# Patient Record
Sex: MALE | Race: BLACK OR AFRICAN AMERICAN | Employment: FULL TIME | URBAN - METROPOLITAN AREA
[De-identification: names, ages, dates, MRNs, and addresses within clinical notes are randomized per-mention and may not be internally consistent; named-entity substitution may affect disease eponyms.]

---

## 2022-09-27 ENCOUNTER — APPOINTMENT (OUTPATIENT)
Dept: GENERAL RADIOLOGY | Age: 47
DRG: 193 | End: 2022-09-27
Payer: COMMERCIAL

## 2022-09-27 ENCOUNTER — HOSPITAL ENCOUNTER (INPATIENT)
Age: 47
LOS: 4 days | Discharge: HOME | DRG: 193 | End: 2022-10-01
Attending: STUDENT IN AN ORGANIZED HEALTH CARE EDUCATION/TRAINING PROGRAM | Admitting: INTERNAL MEDICINE
Payer: COMMERCIAL

## 2022-09-27 ENCOUNTER — APPOINTMENT (OUTPATIENT)
Dept: CT IMAGING | Age: 47
DRG: 193 | End: 2022-09-27
Payer: COMMERCIAL

## 2022-09-27 DIAGNOSIS — J96.01 ACUTE RESPIRATORY FAILURE WITH HYPOXIA (HCC): ICD-10-CM

## 2022-09-27 DIAGNOSIS — I16.1 HYPERTENSIVE EMERGENCY: Primary | ICD-10-CM

## 2022-09-27 DIAGNOSIS — J18.9 PNEUMONIA OF RIGHT LUNG DUE TO INFECTIOUS ORGANISM, UNSPECIFIED PART OF LUNG: ICD-10-CM

## 2022-09-27 PROBLEM — I16.0 HYPERTENSIVE URGENCY: Status: ACTIVE | Noted: 2022-09-27

## 2022-09-27 LAB
A/G RATIO: 1.2 (ref 1.1–2.2)
ALBUMIN SERPL-MCNC: 3.8 G/DL (ref 3.4–5)
ALP BLD-CCNC: 90 U/L (ref 40–129)
ALT SERPL-CCNC: 29 U/L (ref 10–40)
ANION GAP SERPL CALCULATED.3IONS-SCNC: 11 MMOL/L (ref 3–16)
AST SERPL-CCNC: 16 U/L (ref 15–37)
BASOPHILS ABSOLUTE: 0.1 K/UL (ref 0–0.2)
BASOPHILS RELATIVE PERCENT: 1.2 %
BILIRUB SERPL-MCNC: 1.1 MG/DL (ref 0–1)
BUN BLDV-MCNC: 11 MG/DL (ref 7–20)
CALCIUM SERPL-MCNC: 9 MG/DL (ref 8.3–10.6)
CHLORIDE BLD-SCNC: 103 MMOL/L (ref 99–110)
CO2: 24 MMOL/L (ref 21–32)
CREAT SERPL-MCNC: 0.8 MG/DL (ref 0.9–1.3)
D DIMER: 0.4 UG/ML FEU (ref 0–0.6)
EKG ATRIAL RATE: 108 BPM
EKG DIAGNOSIS: NORMAL
EKG P AXIS: 18 DEGREES
EKG P-R INTERVAL: 172 MS
EKG Q-T INTERVAL: 362 MS
EKG QRS DURATION: 88 MS
EKG QTC CALCULATION (BAZETT): 485 MS
EKG R AXIS: 68 DEGREES
EKG T AXIS: 13 DEGREES
EKG VENTRICULAR RATE: 108 BPM
EOSINOPHILS ABSOLUTE: 0 K/UL (ref 0–0.6)
EOSINOPHILS RELATIVE PERCENT: 0.7 %
GFR AFRICAN AMERICAN: >60
GFR NON-AFRICAN AMERICAN: >60
GLUCOSE BLD-MCNC: 328 MG/DL (ref 70–99)
GLUCOSE BLD-MCNC: 404 MG/DL (ref 70–99)
HCT VFR BLD CALC: 42.9 % (ref 40.5–52.5)
HEMOGLOBIN: 14 G/DL (ref 13.5–17.5)
LYMPHOCYTES ABSOLUTE: 1.2 K/UL (ref 1–5.1)
LYMPHOCYTES RELATIVE PERCENT: 18.3 %
MCH RBC QN AUTO: 28.2 PG (ref 26–34)
MCHC RBC AUTO-ENTMCNC: 32.6 G/DL (ref 31–36)
MCV RBC AUTO: 86.6 FL (ref 80–100)
MONOCYTES ABSOLUTE: 0.4 K/UL (ref 0–1.3)
MONOCYTES RELATIVE PERCENT: 6.1 %
NEUTROPHILS ABSOLUTE: 4.9 K/UL (ref 1.7–7.7)
NEUTROPHILS RELATIVE PERCENT: 73.7 %
PDW BLD-RTO: 14.3 % (ref 12.4–15.4)
PERFORMED ON: ABNORMAL
PLATELET # BLD: 233 K/UL (ref 135–450)
PMV BLD AUTO: 8.9 FL (ref 5–10.5)
POTASSIUM SERPL-SCNC: 4.1 MMOL/L (ref 3.5–5.1)
PRO-BNP: 1868 PG/ML (ref 0–124)
PROCALCITONIN: 0.08 NG/ML (ref 0–0.15)
RBC # BLD: 4.96 M/UL (ref 4.2–5.9)
SODIUM BLD-SCNC: 138 MMOL/L (ref 136–145)
TOTAL PROTEIN: 6.9 G/DL (ref 6.4–8.2)
TROPONIN: <0.01 NG/ML
WBC # BLD: 6.6 K/UL (ref 4–11)

## 2022-09-27 PROCEDURE — 94640 AIRWAY INHALATION TREATMENT: CPT

## 2022-09-27 PROCEDURE — 2580000003 HC RX 258: Performed by: STUDENT IN AN ORGANIZED HEALTH CARE EDUCATION/TRAINING PROGRAM

## 2022-09-27 PROCEDURE — 6370000000 HC RX 637 (ALT 250 FOR IP): Performed by: PHYSICIAN ASSISTANT

## 2022-09-27 PROCEDURE — 84484 ASSAY OF TROPONIN QUANT: CPT

## 2022-09-27 PROCEDURE — 83036 HEMOGLOBIN GLYCOSYLATED A1C: CPT

## 2022-09-27 PROCEDURE — 84145 PROCALCITONIN (PCT): CPT

## 2022-09-27 PROCEDURE — 71260 CT THORAX DX C+: CPT | Performed by: PHYSICIAN ASSISTANT

## 2022-09-27 PROCEDURE — 93005 ELECTROCARDIOGRAM TRACING: CPT | Performed by: STUDENT IN AN ORGANIZED HEALTH CARE EDUCATION/TRAINING PROGRAM

## 2022-09-27 PROCEDURE — 99285 EMERGENCY DEPT VISIT HI MDM: CPT

## 2022-09-27 PROCEDURE — 96365 THER/PROPH/DIAG IV INF INIT: CPT

## 2022-09-27 PROCEDURE — U0003 INFECTIOUS AGENT DETECTION BY NUCLEIC ACID (DNA OR RNA); SEVERE ACUTE RESPIRATORY SYNDROME CORONAVIRUS 2 (SARS-COV-2) (CORONAVIRUS DISEASE [COVID-19]), AMPLIFIED PROBE TECHNIQUE, MAKING USE OF HIGH THROUGHPUT TECHNOLOGIES AS DESCRIBED BY CMS-2020-01-R: HCPCS

## 2022-09-27 PROCEDURE — 85025 COMPLETE CBC W/AUTO DIFF WBC: CPT

## 2022-09-27 PROCEDURE — 96375 TX/PRO/DX INJ NEW DRUG ADDON: CPT

## 2022-09-27 PROCEDURE — 2580000003 HC RX 258: Performed by: INTERNAL MEDICINE

## 2022-09-27 PROCEDURE — 6360000002 HC RX W HCPCS: Performed by: STUDENT IN AN ORGANIZED HEALTH CARE EDUCATION/TRAINING PROGRAM

## 2022-09-27 PROCEDURE — 93010 ELECTROCARDIOGRAM REPORT: CPT | Performed by: INTERNAL MEDICINE

## 2022-09-27 PROCEDURE — 6360000002 HC RX W HCPCS: Performed by: PHYSICIAN ASSISTANT

## 2022-09-27 PROCEDURE — 80053 COMPREHEN METABOLIC PANEL: CPT

## 2022-09-27 PROCEDURE — 6360000002 HC RX W HCPCS: Performed by: NURSE PRACTITIONER

## 2022-09-27 PROCEDURE — 2060000000 HC ICU INTERMEDIATE R&B

## 2022-09-27 PROCEDURE — 2500000003 HC RX 250 WO HCPCS: Performed by: STUDENT IN AN ORGANIZED HEALTH CARE EDUCATION/TRAINING PROGRAM

## 2022-09-27 PROCEDURE — 6370000000 HC RX 637 (ALT 250 FOR IP): Performed by: INTERNAL MEDICINE

## 2022-09-27 PROCEDURE — 83880 ASSAY OF NATRIURETIC PEPTIDE: CPT

## 2022-09-27 PROCEDURE — 85379 FIBRIN DEGRADATION QUANT: CPT

## 2022-09-27 PROCEDURE — U0005 INFEC AGEN DETEC AMPLI PROBE: HCPCS

## 2022-09-27 PROCEDURE — 71046 X-RAY EXAM CHEST 2 VIEWS: CPT

## 2022-09-27 PROCEDURE — 6360000004 HC RX CONTRAST MEDICATION: Performed by: PHYSICIAN ASSISTANT

## 2022-09-27 PROCEDURE — 94761 N-INVAS EAR/PLS OXIMETRY MLT: CPT

## 2022-09-27 PROCEDURE — 6370000000 HC RX 637 (ALT 250 FOR IP)

## 2022-09-27 PROCEDURE — 6370000000 HC RX 637 (ALT 250 FOR IP): Performed by: STUDENT IN AN ORGANIZED HEALTH CARE EDUCATION/TRAINING PROGRAM

## 2022-09-27 RX ORDER — ONDANSETRON 4 MG/1
4 TABLET, ORALLY DISINTEGRATING ORAL EVERY 8 HOURS PRN
Status: DISCONTINUED | OUTPATIENT
Start: 2022-09-27 | End: 2022-10-01 | Stop reason: HOSPADM

## 2022-09-27 RX ORDER — METOPROLOL SUCCINATE 100 MG/1
200 TABLET, EXTENDED RELEASE ORAL DAILY
Status: ON HOLD | COMMUNITY
End: 2022-10-01 | Stop reason: HOSPADM

## 2022-09-27 RX ORDER — METOPROLOL SUCCINATE 50 MG/1
200 TABLET, EXTENDED RELEASE ORAL DAILY
Status: DISCONTINUED | OUTPATIENT
Start: 2022-09-27 | End: 2022-10-01 | Stop reason: HOSPADM

## 2022-09-27 RX ORDER — EMPAGLIFLOZIN, METFORMIN HYDROCHLORIDE 12.5; 1 MG/1; MG/1
2 TABLET, EXTENDED RELEASE ORAL DAILY
COMMUNITY
Start: 2022-01-14 | End: 2022-10-05 | Stop reason: SDUPTHER

## 2022-09-27 RX ORDER — ENOXAPARIN SODIUM 100 MG/ML
30 INJECTION SUBCUTANEOUS 2 TIMES DAILY
Status: CANCELLED | OUTPATIENT
Start: 2022-09-27

## 2022-09-27 RX ORDER — SODIUM CHLORIDE 9 MG/ML
INJECTION, SOLUTION INTRAVENOUS PRN
Status: DISCONTINUED | OUTPATIENT
Start: 2022-09-27 | End: 2022-10-01 | Stop reason: HOSPADM

## 2022-09-27 RX ORDER — INSULIN GLARGINE 100 [IU]/ML
10 INJECTION, SOLUTION SUBCUTANEOUS NIGHTLY
Status: DISCONTINUED | OUTPATIENT
Start: 2022-09-27 | End: 2022-09-28

## 2022-09-27 RX ORDER — KETOROLAC TROMETHAMINE 30 MG/ML
15 INJECTION, SOLUTION INTRAMUSCULAR; INTRAVENOUS EVERY 6 HOURS PRN
Status: COMPLETED | OUTPATIENT
Start: 2022-09-27 | End: 2022-09-29

## 2022-09-27 RX ORDER — ACETAMINOPHEN 650 MG/1
650 SUPPOSITORY RECTAL EVERY 6 HOURS PRN
Status: DISCONTINUED | OUTPATIENT
Start: 2022-09-27 | End: 2022-10-01 | Stop reason: HOSPADM

## 2022-09-27 RX ORDER — INSULIN LISPRO 100 [IU]/ML
0-4 INJECTION, SOLUTION INTRAVENOUS; SUBCUTANEOUS NIGHTLY
Status: DISCONTINUED | OUTPATIENT
Start: 2022-09-27 | End: 2022-10-01 | Stop reason: HOSPADM

## 2022-09-27 RX ORDER — LOSARTAN POTASSIUM 25 MG/1
50 TABLET ORAL DAILY
Status: DISCONTINUED | OUTPATIENT
Start: 2022-09-27 | End: 2022-09-29

## 2022-09-27 RX ORDER — INSULIN LISPRO 100 [IU]/ML
4 INJECTION, SOLUTION INTRAVENOUS; SUBCUTANEOUS ONCE
Status: COMPLETED | OUTPATIENT
Start: 2022-09-27 | End: 2022-09-27

## 2022-09-27 RX ORDER — FUROSEMIDE 10 MG/ML
60 INJECTION INTRAMUSCULAR; INTRAVENOUS ONCE
Status: COMPLETED | OUTPATIENT
Start: 2022-09-27 | End: 2022-09-27

## 2022-09-27 RX ORDER — METOPROLOL SUCCINATE 50 MG/1
200 TABLET, EXTENDED RELEASE ORAL ONCE
Status: COMPLETED | OUTPATIENT
Start: 2022-09-27 | End: 2022-09-27

## 2022-09-27 RX ORDER — SODIUM CHLORIDE 0.9 % (FLUSH) 0.9 %
5-40 SYRINGE (ML) INJECTION EVERY 12 HOURS SCHEDULED
Status: DISCONTINUED | OUTPATIENT
Start: 2022-09-27 | End: 2022-10-01 | Stop reason: HOSPADM

## 2022-09-27 RX ORDER — ONDANSETRON 2 MG/ML
4 INJECTION INTRAMUSCULAR; INTRAVENOUS EVERY 6 HOURS PRN
Status: DISCONTINUED | OUTPATIENT
Start: 2022-09-27 | End: 2022-10-01 | Stop reason: HOSPADM

## 2022-09-27 RX ORDER — ALBUTEROL SULFATE 2.5 MG/3ML
5 SOLUTION RESPIRATORY (INHALATION) ONCE
Status: COMPLETED | OUTPATIENT
Start: 2022-09-27 | End: 2022-09-27

## 2022-09-27 RX ORDER — ACETAMINOPHEN 325 MG/1
650 TABLET ORAL EVERY 6 HOURS PRN
Status: DISCONTINUED | OUTPATIENT
Start: 2022-09-27 | End: 2022-10-01 | Stop reason: HOSPADM

## 2022-09-27 RX ORDER — SODIUM CHLORIDE 0.9 % (FLUSH) 0.9 %
5-40 SYRINGE (ML) INJECTION PRN
Status: DISCONTINUED | OUTPATIENT
Start: 2022-09-27 | End: 2022-10-01 | Stop reason: HOSPADM

## 2022-09-27 RX ORDER — IPRATROPIUM BROMIDE AND ALBUTEROL SULFATE 2.5; .5 MG/3ML; MG/3ML
1 SOLUTION RESPIRATORY (INHALATION) ONCE
Status: COMPLETED | OUTPATIENT
Start: 2022-09-27 | End: 2022-09-27

## 2022-09-27 RX ORDER — POLYETHYLENE GLYCOL 3350 17 G/17G
17 POWDER, FOR SOLUTION ORAL DAILY PRN
Status: DISCONTINUED | OUTPATIENT
Start: 2022-09-27 | End: 2022-10-01 | Stop reason: HOSPADM

## 2022-09-27 RX ORDER — ACETAMINOPHEN 325 MG/1
TABLET ORAL
Status: COMPLETED
Start: 2022-09-27 | End: 2022-09-27

## 2022-09-27 RX ORDER — INSULIN LISPRO 100 [IU]/ML
0-8 INJECTION, SOLUTION INTRAVENOUS; SUBCUTANEOUS
Status: DISCONTINUED | OUTPATIENT
Start: 2022-09-27 | End: 2022-10-01 | Stop reason: HOSPADM

## 2022-09-27 RX ORDER — INSULIN LISPRO 100 [IU]/ML
8 INJECTION, SOLUTION INTRAVENOUS; SUBCUTANEOUS ONCE
Status: DISCONTINUED | OUTPATIENT
Start: 2022-09-27 | End: 2022-09-27

## 2022-09-27 RX ADMIN — INSULIN GLARGINE 10 UNITS: 100 INJECTION, SOLUTION SUBCUTANEOUS at 21:58

## 2022-09-27 RX ADMIN — Medication 1000 MG: at 15:48

## 2022-09-27 RX ADMIN — ACETAMINOPHEN 650 MG: 325 TABLET ORAL at 19:27

## 2022-09-27 RX ADMIN — METOPROLOL SUCCINATE 200 MG: 50 TABLET, EXTENDED RELEASE ORAL at 21:52

## 2022-09-27 RX ADMIN — DOXYCYCLINE 100 MG: 100 INJECTION, POWDER, LYOPHILIZED, FOR SOLUTION INTRAVENOUS at 16:33

## 2022-09-27 RX ADMIN — INSULIN LISPRO 4 UNITS: 100 INJECTION, SOLUTION INTRAVENOUS; SUBCUTANEOUS at 22:00

## 2022-09-27 RX ADMIN — IOPAMIDOL 75 ML: 755 INJECTION, SOLUTION INTRAVENOUS at 14:25

## 2022-09-27 RX ADMIN — Medication 10 ML: at 21:52

## 2022-09-27 RX ADMIN — KETOROLAC TROMETHAMINE 15 MG: 30 INJECTION, SOLUTION INTRAMUSCULAR at 21:52

## 2022-09-27 RX ADMIN — IPRATROPIUM BROMIDE AND ALBUTEROL SULFATE 1 AMPULE: .5; 2.5 SOLUTION RESPIRATORY (INHALATION) at 11:49

## 2022-09-27 RX ADMIN — METOPROLOL SUCCINATE 200 MG: 50 TABLET, EXTENDED RELEASE ORAL at 14:46

## 2022-09-27 RX ADMIN — LOSARTAN POTASSIUM 50 MG: 25 TABLET, FILM COATED ORAL at 21:52

## 2022-09-27 RX ADMIN — ALBUTEROL SULFATE 5 MG: 2.5 SOLUTION RESPIRATORY (INHALATION) at 11:49

## 2022-09-27 RX ADMIN — FUROSEMIDE 60 MG: 10 INJECTION, SOLUTION INTRAMUSCULAR; INTRAVENOUS at 15:28

## 2022-09-27 RX ADMIN — NITROGLYCERIN 1.5 INCH: 20 OINTMENT TOPICAL at 15:48

## 2022-09-27 RX ADMIN — INSULIN LISPRO 4 UNITS: 100 INJECTION, SOLUTION INTRAVENOUS; SUBCUTANEOUS at 22:43

## 2022-09-27 ASSESSMENT — PAIN DESCRIPTION - LOCATION
LOCATION: HEAD
LOCATION: CHEST
LOCATION: HEAD

## 2022-09-27 ASSESSMENT — PAIN DESCRIPTION - DESCRIPTORS
DESCRIPTORS: ACHING

## 2022-09-27 ASSESSMENT — ENCOUNTER SYMPTOMS
BACK PAIN: 0
SINUS PRESSURE: 1
SORE THROAT: 0
CHEST TIGHTNESS: 0
SHORTNESS OF BREATH: 1
WHEEZING: 1
COUGH: 1
DIARRHEA: 0
NAUSEA: 0
VOMITING: 0
ABDOMINAL PAIN: 0

## 2022-09-27 ASSESSMENT — PAIN SCALES - GENERAL
PAINLEVEL_OUTOF10: 7
PAINLEVEL_OUTOF10: 7
PAINLEVEL_OUTOF10: 4
PAINLEVEL_OUTOF10: 5
PAINLEVEL_OUTOF10: 4
PAINLEVEL_OUTOF10: 7
PAINLEVEL_OUTOF10: 4

## 2022-09-27 ASSESSMENT — PAIN - FUNCTIONAL ASSESSMENT: PAIN_FUNCTIONAL_ASSESSMENT: 0-10

## 2022-09-27 NOTE — ED NOTES
Pt and family upset, stating they do not know what is going on, pt is asking for a work note and to leave    I explained out process with the wife, I asked the provided to come and update the pt  Dr Gildardo Varela is going to see the pt and his wife.       Savita Augustin RN  09/27/22 9329

## 2022-09-27 NOTE — H&P
HOSPITALISTS HISTORY AND PHYSICAL    9/27/2022 5:04 PM    Patient Information:  Danilo Davis is a 52 y.o. male [id number]  PCP:  Rosalva Nayak MD (Tel: [phone] )    Chief complaint:    Chief Complaint   Patient presents with    Chest Pain     Chest pain, headache, vomiting onset Sunday evening       History of Present Illness:  Kathlynn Nageotte is a 52 y.o. male having shortness of breath along with vomiting for the last couple days. Been having a cough productive with green sputum no fevers or chills, has been having exertional dyspnea. No sick contacts, no smoking or drinking,         REVIEW OF SYSTEMS:   Constitutional: Negative for fever,chills or night sweats  ENT: Negative for rhinorrhea, epistaxis, hoarseness, sore throat. Respiratory: see aboive  Cardiovascular: Negative for chest pain, palpitations   Gastrointestinal: Negative for nausea, vomiting, diarrhea  Genitourinary: Negative for polyuria, dysuria   Hematologic/Lymphatic: Negative for bleeding tendency, easy bruising  Musculoskeletal: Negative for myalgias and arthralgias  Neurologic: Negative for confusion,dysarthria. Skin: Negative for itching,rash  Psychiatric: Negative for depression,anxiety, agitation. Endocrine: Negative for polydipsia,polyuria,heat /cold intolerance. Past Medical History:   has a past medical history of CHF (congestive heart failure) ([facility] [address] Hyperlipidemia, Hypertension, and Sleep apnea. Past Surgical History:   has a past surgical history that includes hernia repair. Medications:  No current facility-administered medications on file prior to encounter.      Current Outpatient Medications on File Prior to Encounter   Medication Sig Dispense Refill    Empagliflozin-metFORMIN HCl ER (SYNJARDY XR) 12.5-1000 MG TB24 Take 2 tablets by mouth daily      metoprolol succinate (TOPROL XL) 100 MG extended

## 2022-09-27 NOTE — ED PROVIDER NOTES
MidLevel Attestation   I havepersonally performed and/or participated in the history, exam and medical decision making and agree with all pertinent clinical information. I have also reviewed and agree with the past medical, family and social historyunless otherwise noted. I have personally performed a face to face diagnostic evaluation onthis patient. I have reviewed the mid-levels findings and agree. In brief, Moira Alvarado is a 52 y.o. male that presented to the emergency department with   Chief Complaint   Patient presents with    Chest Pain     Chest pain, headache, vomiting onset Sunday evening   . CONSTITUTIONAL: Well appearing, in no acute distress   EYES: PERRL, No injection, discharge or scleral icterus. NECK: Normal ROM, NO LAD and Moist mucous membranes. CARDIOVASCULAR: Regular rate and rhythm. No murmurs or gallop. PULMONARY/CHEST: Airway patent. No retractions. Breath sounds clear with good air entry bilaterally. ABDOMEN: Soft, Non-distended and non-tender, without guarding or rebound. SKIN: Acyanotic, warm, dry   MUSCULOSKELETAL: No swelling, tenderness or deformity   NEUROLOGICAL: Awake. Pulses intact. Grossly nonfocal     Moira Alvarado, 24-year-old -American male history of heart failure who presents complaining of chest pain associated with headaches and vomiting that started 2 days ago. Addition patient was hypertensive with blood pressures in the 210s over 150s. Tachycardic with heart rates in the 110s. Showed elevated proBNP which I believe is secondary to heart failure from elevated blood pressures. EKG showed no signs of ischemia and x-ray was unremarkable. Given nondiagnostic x-ray we did get a CT PE which showed multifocal pneumonia viral cannot be excluded. Patient has been given 200 of metoprolol as well as started on IV doxycycline and ceftriaxone for pneumonia. Of note his sats is in the 90s which is low given the fact the patient is not a smoker.   I believe that this is heart failure superimposed by pneumonia causing hypoxia. I am also concerned that this is hypertensive emergency. At this point given this findings I am recommending that he be admitted for further evaluation and treatment. Hospitalist been consulted pending admission. EKG by my preliminary interpretation shows sinus tachy 108, normal axis, normal intervals, with no ST changes indicative of ischemia at this time.       I have reviewed and interpreted all of the currently available lab results and diagnostics from this visit:  Results for orders placed or performed during the hospital encounter of 09/27/22   CBC with Auto Differential   Result Value Ref Range    WBC 6.6 4.0 - 11.0 K/uL    RBC 4.96 4.20 - 5.90 M/uL    Hemoglobin 14.0 13.5 - 17.5 g/dL    Hematocrit 42.9 40.5 - 52.5 %    MCV 86.6 80.0 - 100.0 fL    MCH 28.2 26.0 - 34.0 pg    MCHC 32.6 31.0 - 36.0 g/dL    RDW 14.3 12.4 - 15.4 %    Platelets 224 029 - 107 K/uL    MPV 8.9 5.0 - 10.5 fL    Neutrophils % 73.7 %    Lymphocytes % 18.3 %    Monocytes % 6.1 %    Eosinophils % 0.7 %    Basophils % 1.2 %    Neutrophils Absolute 4.9 1.7 - 7.7 K/uL    Lymphocytes Absolute 1.2 1.0 - 5.1 K/uL    Monocytes Absolute 0.4 0.0 - 1.3 K/uL    Eosinophils Absolute 0.0 0.0 - 0.6 K/uL    Basophils Absolute 0.1 0.0 - 0.2 K/uL   Comprehensive Metabolic Panel   Result Value Ref Range    Sodium 138 136 - 145 mmol/L    Potassium 4.1 3.5 - 5.1 mmol/L    Chloride 103 99 - 110 mmol/L    CO2 24 21 - 32 mmol/L    Anion Gap 11 3 - 16    Glucose 328 (H) 70 - 99 mg/dL    BUN 11 7 - 20 mg/dL    Creatinine 0.8 (L) 0.9 - 1.3 mg/dL    GFR Non-African American >60 >60    GFR African American >60 >60    Calcium 9.0 8.3 - 10.6 mg/dL    Total Protein 6.9 6.4 - 8.2 g/dL    Albumin 3.8 3.4 - 5.0 g/dL    Albumin/Globulin Ratio 1.2 1.1 - 2.2    Total Bilirubin 1.1 (H) 0.0 - 1.0 mg/dL    Alkaline Phosphatase 90 40 - 129 U/L    ALT 29 10 - 40 U/L    AST 16 15 - 37 U/L   Troponin Result Value Ref Range    Troponin <0.01 <0.01 ng/mL   Brain Natriuretic Peptide   Result Value Ref Range    Pro-BNP 1,868 (H) 0 - 124 pg/mL   D-Dimer, Quantitative   Result Value Ref Range    D-Dimer, Quant 0.40 0.00 - 0.60 ug/mL FEU   EKG 12 Lead   Result Value Ref Range    Ventricular Rate 108 BPM    Atrial Rate 108 BPM    P-R Interval 172 ms    QRS Duration 88 ms    Q-T Interval 362 ms    QTc Calculation (Bazett) 485 ms    P Axis 18 degrees    R Axis 68 degrees    T Axis 13 degrees    Diagnosis       Sinus tachycardiaPossible Left atrial enlargementNonspecific ST and T wave abnormalityConfirmed by [facility] Hao DAVIS ([id number]) on 9/27/2022 11:21:41 AM     XR CHEST (2 VW)    Result Date: 9/27/2022  EXAMINATION: TWO XRAY VIEWS OF THE CHEST 9/27/2022 10:46 am COMPARISON: None. HISTORY: ORDERING SYSTEM PROVIDED HISTORY: SOB - cough TECHNOLOGIST PROVIDED HISTORY: Reason for exam:->SOB - cough Reason for Exam: shortness of breath, cough FINDINGS: Normal cardiomediastinal silhouette. No acute airspace infiltrate. No pneumothorax or pleural effusion. No acute cardiopulmonary findings. CT CHEST PULMONARY EMBOLISM W CONTRAST    Result Date: 9/27/2022  EXAMINATION: CTA OF THE CHEST 9/27/2022 11:31 am TECHNIQUE: CTA of the chest was performed after the administration of intravenous contrast.  Multiplanar reformatted images are provided for review. MIP images are provided for review. Automated exposure control, iterative reconstruction, and/or weight based adjustment of the mA/kV was utilized to reduce the radiation dose to as low as reasonably achievable. COMPARISON: None. HISTORY: ORDERING SYSTEM PROVIDED HISTORY: SOB suspect pulmonary embolus TECHNOLOGIST PROVIDED HISTORY: If patient is on cardiac monitor and/or pulse ox, they may be taken off cardiac monitor and pulse ox, left on O2 if currently on. All monitors reattached when patient returns to room.  Reason for exam:->SOB suspect pulmonary embolus Decision Support Exception - unselect if not a suspected or confirmed emergency medical condition->Emergency Medical Condition (MA) Reason for Exam: SOB suspect pulmonary embolus FINDINGS: Pulmonary Arteries: Pulmonary arteries are adequately opacified. No filling defects are seen within the pulmonary arteries to suggest pulmonary embolism. Mediastinum: Visualized thyroid unremarkable. Shotty mediastinal lymph nodes are seen which are likely reactive. The esophagus is unremarkable. Cardiac chambers unremarkable. No pericardial effusion. Thoracic aorta normal in caliber without evidence of dissection. Lungs/pleura: Patchy ground-glass infiltrates are seen within the lungs bilaterally, greatest in the right upper lobe. A very small right pleural effusion is present. No pneumothorax is found. Airways are patent. Upper Abdomen: Limited images of the upper abdomen are unremarkable. Soft Tissues/Bones: No acute or suspicious bony abnormalities. There is mild bilateral gynecomastia. The visualized extra thoracic soft tissues otherwise unremarkable. No evidence of pulmonary embolism or aortic dissection. Patchy ground-glass infiltrates within both lungs, greatest within the right upper lobe, most compatible with multifocal pneumonia. Consider both typical and atypical etiologies, including viral pneumonia. RECOMMENDATIONS: Unavailable      ED Medication Orders (From admission, onward)      Start Ordered     Status Ordering Provider    09/27/22 1530 09/27/22 1524  cefTRIAXone (ROCEPHIN) 1000 mg in sterile water 10 mL IV syringe  ONCE        Question:  Antimicrobial Indications  Answer:  Pneumonia (CAP)   See Hyperspace for full Linked Orders Report. Ordered CONOR ISRAEL    09/27/22 1530 09/27/22 1524  doxycycline (VIBRAMYCIN) 100 mg in dextrose 5 % 100 mL IVPB  ONCE        Question:  Antimicrobial Indications  Answer:  Pneumonia (CAP)   See Hyperspace for full Linked Orders Report.     Ordered Rogelio Chacon A    09/27/22 1515 09/27/22 1507  furosemide (LASIX) injection 60 mg  ONCE         Last MAR action: Given - by Suha Leone on 09/27/22 at 1528 CONOR ISRAEL A    09/27/22 1423 09/27/22 1423  iopamidol (ISOVUE-370) 76 % injection 75 mL  IMG ONCE PRN         Last MAR action: Given - by Zahraa Bradford on 09/27/22 at 1425 ROBERT DECKER    09/27/22 1345 09/27/22 1341  metoprolol succinate (TOPROL XL) extended release tablet 200 mg  ONCE         Last MAR action: Given - [facility] Su[facility]    09/27/22 1030 09/27/22 1019  albuterol (PROVENTIL) nebulizer solution 5 mg  ONCE        Question:  Initiate RT Bronchodilator Protocol  Answer:  Yes - Inpatient Protocol    Last MAR action: Given - [facility] Maris Gil [facility]22 [facility]    09/27/22 1030 09/27/22 1019  ipratropium-albuterol (DUONEB) nebulizer solution 1 ampule  ONCE        Question:  Initiate RT Bronchodilator Protocol  Answer:  Yes - Inpatient Protocol    Last MAR action: Given - by Maris Gil on 09/27/22 at 1149 ROBERT DECKER            Final Impression      1. Hypertensive emergency    2. Acute respiratory failure with hypoxia (HCC)    3. Pneumonia of right lung due to infectious organism, unspecified part of lung        DISPOSITION Decision To Discharge 09/27/2022 12:32:27 PM       CRITICAL CARE NOTE:  There was a high probability of clinically significant life-threatening deterioration of the patient's condition requiring my urgent intervention. This includes multiple reevaluations, vital sign monitoring, pulse oximetry monitoring, telemetry monitoring, clinical response to the IV medications, reviewing the nursing notes, consultation time, dictation/documentation time, and interpretation of the labwork. (This time excludes time spent performing procedures).      I personally saw the patient and independently provided 35 minutes of non-concurrent critical care out of the total shared critical care time provided. This record is transcribed utilizing voice recognition technology. There are inherent limitations in this technology. In addition, there may be limitations in editing of this report. If there are any discrepancies, please contact me directly.     Alexus Mobley MD   9/27/2022        Lenka Garcia MD  09/27/22 5277

## 2022-09-27 NOTE — ACP (ADVANCE CARE PLANNING)
Advanced Care Planning Note. Purpose of Encounter: Advanced care planning in light of hospitalization  Parties In Attendance: Patient,    Decisional Capacity: Yes  Subjective: Patient  understand that this conversation is to address long term care goal  Objective: Admitted to hospital with hypertensive  and dyspnea  Goals of Care Determination: Patient would pursue CPR and Intubation if required. No tracheostomy or long term ventilation if required.      Code Status: full code  Time spent on Advanced care Plannin minutes  Advanced Care Planning Documents: documented patient's wishes, would like Girlfriend Alyssa Niño to make medical decisions if unable to make decisions    Connie Hubbard MD  2022 5:12 PM

## 2022-09-27 NOTE — ED PROVIDER NOTES
[address] [facility]        Pt Name: Jada Cedillo  MRN: [id number]  [place] 1975  Date of evaluation: 9/27/2022  Provider: Carlos Mendieta PA-C  PCP: Joslyn Castrejon MD  Note Started: 10:31 AM EDT        I have seen and evaluated this patient with my supervising physician Rm Monteiro MD.    [address] [place] [address]       Chief Complaint   Patient presents with    Chest Pain     Chest pain, headache, vomiting onset Sunday evening       HISTORY OF PRESENT ILLNESS   (Location, Timing/Onset, Context/Setting, Quality, Duration, Modifying Factors, Severity, Associated Signs and Symptoms)  Note limiting factors. Chief Complaint: Cough, chest congestion, sinus congestion, generalized soreness    Jada Cedillo is a 52 y.o. male with a history of diabetes and hypertension who presents to the emergency department today stating yesterday he began having sinus congestion, a mildly productive cough with green sputum, and chest congestion. He states his chest wall and even ab muscles are sore from coughing. Otherwise, he denies chest pain. When I ask him if he feels short of breath he states \"wheezy\". He denies heart palpitations, diaphoresis, lightheadedness or dizziness. He has had no fevers or chills. He denies nausea, vomiting or diarrhea. Patient does have high blood pressure on arrival 178/133. He believes he is on metoprolol but states he has not taken it in several days. He has a heart rate of 110. He is resting comfortably in the chair and in no obvious distress. Nursing Notes were all reviewed and agreed with or any disagreements were addressed in the HPI. REVIEW OF SYSTEMS    (2-9 systems for level 4, 10 or more for level 5)     Review of Systems   Constitutional:  Negative for chills, diaphoresis, fatigue and fever. HENT:  Positive for congestion and sinus pressure. Negative for sore throat.     Respiratory:  Positive for cough, shortness of breath and wheezing. Negative for chest tightness. Cardiovascular:  Positive for chest pain (\"Chest muscle pain from coughing\"). Negative for palpitations and leg swelling. Gastrointestinal:  Negative for abdominal pain, diarrhea, nausea and vomiting. Genitourinary:  Negative for difficulty urinating, dysuria, flank pain and frequency. Musculoskeletal:  Negative for arthralgias, back pain, neck pain and neck stiffness. Neurological:  Negative for dizziness, light-headedness and headaches. All other systems reviewed and are negative. Positives and Pertinent negatives as per HPI. Except as noted above in the ROS, all other systems were reviewed and negative. PAST MEDICAL HISTORY     Past Medical History:   Diagnosis Date    CHF (congestive heart failure) ([facility] [address]     Hyperlipidemia     Hypertension     Sleep apnea          SURGICAL HISTORY     Past Surgical History:   Procedure Laterality Date    HERNIA REPAIR           CURRENTMEDICATIONS       Previous Medications    EMPAGLIFLOZIN-METFORMIN HCL ER (SYNJARDY XR) 12.5-1000 MG TB24    Take 2 tablets by mouth daily    METOPROLOL SUCCINATE (TOPROL XL) 100 MG EXTENDED RELEASE TABLET    Take 200 mg by mouth daily         ALLERGIES     Azithromycin and Atorvastatin    FAMILYHISTORY     History reviewed. No pertinent family history.        SOCIAL HISTORY       Social History     Tobacco Use    Smoking status: Never    Smokeless tobacco: Never   Substance Use Topics    Alcohol use: Yes     Comment: occ    Drug use: Not Currently       SCREENINGS    Ladora Coma Scale  Eye Opening: Spontaneous  Best Verbal Response: Oriented  Best Motor Response: Obeys commands  Deisy Coma Scale Score: 15        PHYSICAL EXAM    (up to 7 for level 4, 8 or more for level 5)     ED Triage Vitals [09/27/22 0956]   BP Temp Temp Source Heart Rate Resp SpO2 Height Weight   (!) 178/133 98.8 °F (37.1 °C) Oral (!) 110 16 90 % 5' 8\" (1.727 m) 235 lb (106.6 kg) Physical Exam  Vitals and nursing note reviewed. Constitutional:       Appearance: He is well-developed. He is not diaphoretic. HENT:      Head: Normocephalic and atraumatic. Right Ear: Tympanic membrane, ear canal and external ear normal.      Left Ear: Tympanic membrane, ear canal and external ear normal.      Nose: Congestion present. Mouth/Throat:      Mouth: Mucous membranes are moist.      Pharynx: No oropharyngeal exudate or posterior oropharyngeal erythema. Eyes:      General:         Right eye: No discharge. Left eye: No discharge. Extraocular Movements: Extraocular movements intact. Conjunctiva/sclera: Conjunctivae normal.      Pupils: Pupils are equal, round, and reactive to light. Cardiovascular:      Rate and Rhythm: Tachycardia present. Comments: Hypertensive  Pulmonary:      Effort: Pulmonary effort is normal. No tachypnea, bradypnea, accessory muscle usage, prolonged expiration or respiratory distress. Breath sounds: Wheezing present. Comments: Lungs with scant bilateral wheezing  Abdominal:      General: Abdomen is flat. Bowel sounds are normal.      Tenderness: There is no abdominal tenderness. Musculoskeletal:         General: Normal range of motion. Cervical back: Normal range of motion and neck supple. Skin:     General: Skin is warm and dry. Coloration: Skin is not pale. Neurological:      Mental Status: He is alert and oriented to person, place, and time.    Psychiatric:         Behavior: Behavior normal.       DIAGNOSTIC RESULTS   LABS:    Labs Reviewed   COMPREHENSIVE METABOLIC PANEL - Abnormal; Notable for the following components:       Result Value    Glucose 328 (*)     Creatinine 0.8 (*)     Total Bilirubin 1.1 (*)     All other components within normal limits   BRAIN NATRIURETIC PEPTIDE - Abnormal; Notable for the following components:    Pro-BNP 1,868 (*)     All other components within normal limits   CBC WITH AUTO DIFFERENTIAL   TROPONIN   D-DIMER, QUANTITATIVE   COVID-19       When ordered only abnormal lab results are displayed. All other labs were within normal range or not returned as of this dictation. EKG: When ordered, EKG's are interpreted by the Emergency Department Physician in the absence of a cardiologist.  Please see their note for interpretation of EKG. RADIOLOGY:   Non-plain film images such as CT, Ultrasound and MRI are read by the radiologist. Plain radiographic images are visualized and preliminarily interpreted by the ED Provider with the below findings:        Interpretation per the Radiologist below, if available at the time of this note:    CT CHEST PULMONARY EMBOLISM W CONTRAST   Final Result   No evidence of pulmonary embolism or aortic dissection. Patchy ground-glass infiltrates within both lungs, greatest within the right   upper lobe, most compatible with multifocal pneumonia. Consider both typical   and atypical etiologies, including viral pneumonia. RECOMMENDATIONS:   Unavailable         XR CHEST (2 VW)   Final Result   No acute cardiopulmonary findings.                PROCEDURES   Unless otherwise noted below, none     Procedures    CRITICAL CARE TIME       CONSULTS:  IP CONSULT TO HOSPITALIST      EMERGENCY DEPARTMENT COURSE and DIFFERENTIAL DIAGNOSIS/MDM:   Vitals:    Vitals:    09/27/22 1256 09/27/22 1445 09/27/22 1500 09/27/22 1515   BP: (!) 189/141 (!) 202/139 (!) 201/142 (!) 209/153   Pulse: (!) 112 (!) 115 (!) 112 (!) 108   Resp:  19 (!) 34 19   Temp:       TempSrc:       SpO2: 92% 92% 90% 91%   Weight:       Height:           Patient was given the following medications:  Medications   cefTRIAXone (ROCEPHIN) 1000 mg in sterile water 10 mL IV syringe (has no administration in time range)     And   doxycycline (VIBRAMYCIN) 100 mg in dextrose 5 % 100 mL IVPB (has no administration in time range)   nitroglycerin (NITRO-BID) 2 % ointment 1.5 inch (has no administration in time range)   albuterol (PROVENTIL) nebulizer solution 5 mg (5 mg Nebulization Given 9/27/22 1149)   ipratropium-albuterol (DUONEB) nebulizer solution 1 ampule (1 ampule Inhalation Given 9/27/22 1149)   metoprolol succinate (TOPROL XL) extended release tablet 200 mg (200 mg Oral Given 9/27/22 1446)   iopamidol (ISOVUE-370) 76 % injection 75 mL (75 mLs IntraVENous Given 9/27/22 1425)   furosemide (LASIX) injection 60 mg (60 mg IntraVENous Given 9/27/22 1528)             Patient presented today with a chief complaint of sinus congestion, chest congestion, cough, shortness of breath, wheezing and generalized soreness from coughing. He is significantly hypertensive on arrival and upon further research it appears he is noncompliant with his antihypertensive medication. His EKG shows no signs of acute ischemia or infarction. Troponin <0.01. He does have a history of congestive heart failure and has a proBNP of 1,868. Chest x-ray shows no acute cardiopulmonary findings. CBC is without leukocytosis or anemia. BMP with only a hyperglycemia of 328. Patient is given a breathing treatment as well as a dose of his home blood pressure medication. Patient remains hypertensive and tachycardic with an oxygen saturation around 90%. This did drop to 86% with ambulation. A D-dimer is ordered which is negative. CT chest with contrast is ordered which shows no evidence of PE or aortic dissection however there is patchy groundglass infiltration within both lungs most compatible with multifocal pneumonia. Patient is started on Rocephin and doxycycline. He is given Nitropaste and Lasix. Hospitalist is consulted who agrees to admit the patient at this time for further evaluation and treatment. FINAL IMPRESSION      1. Hypertensive emergency    2. Acute respiratory failure with hypoxia (HCC)    3.  Pneumonia of right lung due to infectious organism, unspecified part of lung          DISPOSITION/PLAN   DISPOSITION Decision To Admit 09/27/2022 03:47:21 PM      PATIENT REFERRED TO:  No follow-up provider specified.     DISCHARGE MEDICATIONS:  New Prescriptions    No medications on file       DISCONTINUED MEDICATIONS:  Discontinued Medications    No medications on file              (Please note that portions of this note were completed with a voice recognition program.  Efforts were made to edit the dictations but occasionally words are mis-transcribed.)    Ana Walker PA-C (electronically signed)           Ana Walker PA-C  09/27/22 1542

## 2022-09-27 NOTE — LETTER
[facility] Emergency Department  [address] [place] [address], [address]             September 27, 2022    Patient: Jamey Roper   YOB: 1975   Date of Visit: 9/27/2022       To Whom It May Concern:    Jamey Roper was seen and treated in our emergency department on 9/27/2022.       Sincerely,         [facility] Emergency Department

## 2022-09-28 ENCOUNTER — APPOINTMENT (OUTPATIENT)
Dept: CT IMAGING | Age: 47
DRG: 193 | End: 2022-09-28
Payer: COMMERCIAL

## 2022-09-28 LAB
ANION GAP SERPL CALCULATED.3IONS-SCNC: 12 MMOL/L (ref 3–16)
BASOPHILS ABSOLUTE: 0.1 K/UL (ref 0–0.2)
BASOPHILS RELATIVE PERCENT: 0.9 %
BUN BLDV-MCNC: 20 MG/DL (ref 7–20)
CALCIUM SERPL-MCNC: 8.7 MG/DL (ref 8.3–10.6)
CHLORIDE BLD-SCNC: 103 MMOL/L (ref 99–110)
CO2: 21 MMOL/L (ref 21–32)
CREAT SERPL-MCNC: 1 MG/DL (ref 0.9–1.3)
EOSINOPHILS ABSOLUTE: 0.1 K/UL (ref 0–0.6)
EOSINOPHILS RELATIVE PERCENT: 2.3 %
ESTIMATED AVERAGE GLUCOSE: 369.5 MG/DL
GFR AFRICAN AMERICAN: >60
GFR NON-AFRICAN AMERICAN: >60
GLUCOSE BLD-MCNC: 234 MG/DL (ref 70–99)
GLUCOSE BLD-MCNC: 261 MG/DL (ref 70–99)
GLUCOSE BLD-MCNC: 292 MG/DL (ref 70–99)
GLUCOSE BLD-MCNC: 313 MG/DL (ref 70–99)
GLUCOSE BLD-MCNC: 321 MG/DL (ref 70–99)
GLUCOSE BLD-MCNC: 354 MG/DL (ref 70–99)
HBA1C MFR BLD: 14.5 %
HCT VFR BLD CALC: 41.7 % (ref 40.5–52.5)
HEMOGLOBIN: 13.5 G/DL (ref 13.5–17.5)
LV EF: 33 %
LVEF MODALITY: NORMAL
LYMPHOCYTES ABSOLUTE: 1.5 K/UL (ref 1–5.1)
LYMPHOCYTES RELATIVE PERCENT: 26.3 %
MCH RBC QN AUTO: 27.7 PG (ref 26–34)
MCHC RBC AUTO-ENTMCNC: 32.4 G/DL (ref 31–36)
MCV RBC AUTO: 85.7 FL (ref 80–100)
MONOCYTES ABSOLUTE: 0.4 K/UL (ref 0–1.3)
MONOCYTES RELATIVE PERCENT: 6.8 %
NEUTROPHILS ABSOLUTE: 3.7 K/UL (ref 1.7–7.7)
NEUTROPHILS RELATIVE PERCENT: 63.7 %
PDW BLD-RTO: 14.1 % (ref 12.4–15.4)
PERFORMED ON: ABNORMAL
PLATELET # BLD: 230 K/UL (ref 135–450)
PMV BLD AUTO: 9.1 FL (ref 5–10.5)
POTASSIUM REFLEX MAGNESIUM: 3.8 MMOL/L (ref 3.5–5.1)
RBC # BLD: 4.86 M/UL (ref 4.2–5.9)
SARS-COV-2: NOT DETECTED
SODIUM BLD-SCNC: 136 MMOL/L (ref 136–145)
WBC # BLD: 5.7 K/UL (ref 4–11)

## 2022-09-28 PROCEDURE — 36415 COLL VENOUS BLD VENIPUNCTURE: CPT

## 2022-09-28 PROCEDURE — 82088 ASSAY OF ALDOSTERONE: CPT

## 2022-09-28 PROCEDURE — 6370000000 HC RX 637 (ALT 250 FOR IP): Performed by: INTERNAL MEDICINE

## 2022-09-28 PROCEDURE — 2060000000 HC ICU INTERMEDIATE R&B

## 2022-09-28 PROCEDURE — 2580000003 HC RX 258: Performed by: INTERNAL MEDICINE

## 2022-09-28 PROCEDURE — 99223 1ST HOSP IP/OBS HIGH 75: CPT | Performed by: INTERNAL MEDICINE

## 2022-09-28 PROCEDURE — 85025 COMPLETE CBC W/AUTO DIFF WBC: CPT

## 2022-09-28 PROCEDURE — 84244 ASSAY OF RENIN: CPT

## 2022-09-28 PROCEDURE — 70450 CT HEAD/BRAIN W/O DYE: CPT

## 2022-09-28 PROCEDURE — 80048 BASIC METABOLIC PNL TOTAL CA: CPT

## 2022-09-28 PROCEDURE — 93306 TTE W/DOPPLER COMPLETE: CPT

## 2022-09-28 PROCEDURE — 6360000002 HC RX W HCPCS: Performed by: INTERNAL MEDICINE

## 2022-09-28 RX ORDER — INSULIN LISPRO 100 [IU]/ML
7 INJECTION, SOLUTION INTRAVENOUS; SUBCUTANEOUS
Status: DISCONTINUED | OUTPATIENT
Start: 2022-09-28 | End: 2022-09-29

## 2022-09-28 RX ORDER — DEXTROSE MONOHYDRATE 100 MG/ML
INJECTION, SOLUTION INTRAVENOUS CONTINUOUS PRN
Status: DISCONTINUED | OUTPATIENT
Start: 2022-09-28 | End: 2022-10-01 | Stop reason: HOSPADM

## 2022-09-28 RX ORDER — AMLODIPINE BESYLATE 5 MG/1
10 TABLET ORAL NIGHTLY
Status: DISCONTINUED | OUTPATIENT
Start: 2022-09-28 | End: 2022-09-30

## 2022-09-28 RX ORDER — FUROSEMIDE 10 MG/ML
40 INJECTION INTRAMUSCULAR; INTRAVENOUS ONCE
Status: COMPLETED | OUTPATIENT
Start: 2022-09-28 | End: 2022-09-28

## 2022-09-28 RX ORDER — INSULIN GLARGINE 100 [IU]/ML
15 INJECTION, SOLUTION SUBCUTANEOUS NIGHTLY
Status: DISCONTINUED | OUTPATIENT
Start: 2022-09-28 | End: 2022-09-29

## 2022-09-28 RX ORDER — CODEINE PHOSPHATE AND GUAIFENESIN 10; 100 MG/5ML; MG/5ML
5 SOLUTION ORAL EVERY 4 HOURS PRN
Status: DISCONTINUED | OUTPATIENT
Start: 2022-09-28 | End: 2022-10-01 | Stop reason: HOSPADM

## 2022-09-28 RX ADMIN — FUROSEMIDE 40 MG: 10 INJECTION, SOLUTION INTRAMUSCULAR; INTRAVENOUS at 14:10

## 2022-09-28 RX ADMIN — INSULIN LISPRO 2 UNITS: 100 INJECTION, SOLUTION INTRAVENOUS; SUBCUTANEOUS at 18:28

## 2022-09-28 RX ADMIN — GUAIFENESIN AND CODEINE PHOSPHATE 5 ML: 100; 10 SOLUTION ORAL at 08:20

## 2022-09-28 RX ADMIN — INSULIN LISPRO 7 UNITS: 100 INJECTION, SOLUTION INTRAVENOUS; SUBCUTANEOUS at 18:27

## 2022-09-28 RX ADMIN — AMLODIPINE BESYLATE 10 MG: 5 TABLET ORAL at 21:36

## 2022-09-28 RX ADMIN — INSULIN GLARGINE 15 UNITS: 100 INJECTION, SOLUTION SUBCUTANEOUS at 21:36

## 2022-09-28 RX ADMIN — INSULIN LISPRO 6 UNITS: 100 INJECTION, SOLUTION INTRAVENOUS; SUBCUTANEOUS at 14:10

## 2022-09-28 RX ADMIN — Medication 10 ML: at 08:20

## 2022-09-28 RX ADMIN — LOSARTAN POTASSIUM 50 MG: 25 TABLET, FILM COATED ORAL at 08:19

## 2022-09-28 RX ADMIN — Medication 10 ML: at 21:39

## 2022-09-28 RX ADMIN — INSULIN LISPRO 6 UNITS: 100 INJECTION, SOLUTION INTRAVENOUS; SUBCUTANEOUS at 08:24

## 2022-09-28 RX ADMIN — METOPROLOL SUCCINATE 200 MG: 50 TABLET, EXTENDED RELEASE ORAL at 08:19

## 2022-09-28 RX ADMIN — POLYETHYLENE GLYCOL 3350 17 G: 17 POWDER, FOR SOLUTION ORAL at 16:57

## 2022-09-28 RX ADMIN — Medication 10 ML: at 08:28

## 2022-09-28 RX ADMIN — INSULIN LISPRO 7 UNITS: 100 INJECTION, SOLUTION INTRAVENOUS; SUBCUTANEOUS at 14:11

## 2022-09-28 NOTE — CONSULTS
injection 5-40 mL, 2 times per day  sodium chloride flush 0.9 % injection 5-40 mL, PRN  0.9 % sodium chloride infusion, PRN  ondansetron (ZOFRAN-ODT) disintegrating tablet 4 mg, Q8H PRN   Or  ondansetron (ZOFRAN) injection 4 mg, Q6H PRN  polyethylene glycol (GLYCOLAX) packet 17 g, Daily PRN  acetaminophen (TYLENOL) tablet 650 mg, Q6H PRN   Or  acetaminophen (TYLENOL) suppository 650 mg, Q6H PRN  ketorolac (TORADOL) injection 15 mg, Q6H PRN        Review of Systems:   14 point ROS obtained but were negative except mentioned in HPI      Physical exam:     Vitals:  BP (!) 160/96   Pulse 82   Temp 98.9 °F (37.2 °C) (Oral)   Resp 18   Ht 5' 8\" (1.727 m)   Wt 235 lb 8 oz (106.8 kg)   SpO2 92%   BMI 35.81 kg/m²   Constitutional:  OAA X3 NAD  Skin: no rash, turgor wnl  Heent:  eomi, mmm  Neck: no bruits or jvd noted  Cardiovascular:  S1, S2 without m/r/g  Respiratory: CTA B without w/r/r  Abdomen:  +bs, soft, nt, nd  Ext: no  lower extremity edema  Psychiatric: mood and affect appropriate  Musculoskeletal:  Rom, muscular strength intact    Labs:  CBC:   Recent Labs     09/27/22  1039 09/28/22  0548   WBC 6.6 5.7   HGB 14.0 13.5    230     BMP:    Recent Labs     09/27/22  1039 09/28/22  0548    136   K 4.1 3.8    103   CO2 24 21   BUN 11 20   CREATININE 0.8* 1.0   GLUCOSE 328* 354*     Ca/Mg/Phos:   Recent Labs     09/27/22  1039 09/28/22  0548   CALCIUM 9.0 8.7     Hepatic:   Recent Labs     09/27/22  1039   AST 16   ALT 29   BILITOT 1.1*   ALKPHOS 90     Troponin:   Recent Labs     09/27/22  1039   TROPONINI <0.01         IMAGING:  CT CHEST PULMONARY EMBOLISM W CONTRAST   Final Result   No evidence of pulmonary embolism or aortic dissection. Patchy ground-glass infiltrates within both lungs, greatest within the right   upper lobe, most compatible with multifocal pneumonia. Consider both typical   and atypical etiologies, including viral pneumonia.       RECOMMENDATIONS:   Unavailable XR CHEST (2 VW)   Final Result   No acute cardiopulmonary findings. CT HEAD WO CONTRAST    (Results Pending)                       Assessment/Plan :      1. HTN urgency   Chest pain   Continue losartan , metoprolol. Will add amlodipine 10 mg po at bedtime     Hypertension education given     ? Lose weight (if you are overweight)  ? Choose a diet low in fat and rich in fruits, vegetables, and low-fat dairy products  ? Reduce the amount of salt you eat, avoid sodas. ?Do something active for at least 30 minutes a day on most days of the week  ? Cut down on alcohol (if you drink more than 2 alcoholic drinks per day), if you smoke then try to quit. 3. Anemia  Hb stable. 4. Acid- base disorder. monitor       R/o secondary HTN   Check renin / aldosterone levels.    Check renal doppler study                   D/w primary team      Thank you for allowing us to participate in care of [place]         Electronically signed by: Gee Bullard MD, 9/28/2022, 9:11 AM      [facility] [address]  Office : [phone]  Fax :[phone]

## 2022-09-28 NOTE — PLAN OF CARE
Pt here from for increased shortness of breath and chest pain; pt is currently on 2 liters oxygen via nasal cannula to maintain oxygen saturation, baseline room air; pt remains steady on feet, alert & orientated x4. Treatment plan and medications reviewed with patient & significant other. Pt stated he takes a diuretic at home. Pt does not know the name of said medication. Consults placed with dietician and diabetes educator after speaking with pt; pt would benefit from education. Pt uses call light appropriately to express needs. Blood pressure and blood glucose level remain elevated; hospitalist notified. Pt plans to discharge home when medically stable.       Problem: Discharge Planning  Goal: Discharge to home or other facility with appropriate resources  Outcome: Progressing     Problem: Pain  Goal: Verbalizes/displays adequate comfort level or baseline comfort level  Outcome: Progressing     Problem: Neurosensory - Adult  Goal: Achieves maximal functionality and self care  Outcome: Progressing     Problem: Respiratory - Adult  Goal: Achieves optimal ventilation and oxygenation  Outcome: Progressing     Problem: Cardiovascular - Adult  Goal: Maintains optimal cardiac output and hemodynamic stability  Outcome: Progressing  Goal: Absence of cardiac dysrhythmias or at baseline  Outcome: Progressing     Problem: Skin/Tissue Integrity - Adult  Goal: Skin integrity remains intact  Outcome: Progressing  Goal: Oral mucous membranes remain intact  Outcome: Progressing     Problem: Musculoskeletal - Adult  Goal: Return mobility to safest level of function  Outcome: Progressing     Problem: Gastrointestinal - Adult  Goal: Minimal or absence of nausea and vomiting  Outcome: Progressing     Problem: Genitourinary - Adult  Goal: Absence of urinary retention  Outcome: Progressing     Problem: Metabolic/Fluid and Electrolytes - Adult  Goal: Electrolytes maintained within normal limits  Outcome: Progressing  Goal: Hemodynamic stability and optimal renal function maintained  Outcome: Progressing  Goal: Glucose maintained within prescribed range  Outcome: Progressing     Problem: Hematologic - Adult  Goal: Maintains hematologic stability  Outcome: Progressing

## 2022-09-28 NOTE — PROGRESS NOTES
CT Scan of head cannot be done until afternoon of 9/28 due to contrast given earlier during day per CT technicians who informed MD earlier.

## 2022-09-28 NOTE — PROGRESS NOTES
Patient had his ECHO, CT and tomorrow morning will have a renal ultrasound. He will be NPO after midnight. He put out 1300 mls of urine today on day shift. Patient has a Cardiology consult, BS is coming down and so is blood pressure.

## 2022-09-28 NOTE — PROGRESS NOTES
[facility]    9/28/2022 11:30 AM        Name: [place] .               Admitted: 9/27/2022  Primary Care Provider: Ronald Macias MD (Tel: [phone])      Subjective:    Respiratory some shortness of breath cough no nausea vomiting fevers or chills    Reviewed interval ancillary notes    Current Medications  guaiFENesin-codeine (GUAIFENESIN AC) 100-10 MG/5ML liquid 5 mL, Q4H PRN  amLODIPine (NORVASC) tablet 10 mg, Nightly  dextrose bolus 10% 125 mL, PRN   Or  dextrose bolus 10% 250 mL, PRN  glucagon (rDNA) injection 1 mg, PRN  dextrose 10 % infusion, Continuous PRN  insulin glargine (LANTUS) injection vial 15 Units, Nightly  insulin lispro (HUMALOG) injection vial 7 Units, TID WC  furosemide (LASIX) injection 40 mg, Once  perflutren lipid microspheres (DEFINITY) injection 1.65 mg, ONCE PRN  insulin lispro (HUMALOG) injection vial 0-8 Units, TID WC  insulin lispro (HUMALOG) injection vial 0-4 Units, Nightly  metoprolol succinate (TOPROL XL) extended release tablet 200 mg, Daily  losartan (COZAAR) tablet 50 mg, Daily  sodium chloride flush 0.9 % injection 5-40 mL, 2 times per day  sodium chloride flush 0.9 % injection 5-40 mL, PRN  0.9 % sodium chloride infusion, PRN  ondansetron (ZOFRAN-ODT) disintegrating tablet 4 mg, Q8H PRN   Or  ondansetron (ZOFRAN) injection 4 mg, Q6H PRN  polyethylene glycol (GLYCOLAX) packet 17 g, Daily PRN  acetaminophen (TYLENOL) tablet 650 mg, Q6H PRN   Or  acetaminophen (TYLENOL) suppository 650 mg, Q6H PRN  ketorolac (TORADOL) injection 15 mg, Q6H PRN        Objective:  BP (!) 153/105   Pulse 83   Temp 98.1 °F (36.7 °C) (Oral)   Resp 20   Ht 5' 8\" (1.727 m)   Wt 235 lb 8 oz (106.8 kg)   SpO2 92%   BMI 35.81 kg/m²     Intake/Output Summary (Last 24 hours) at 9/28/2022 1130  Last data filed at 9/28/2022 0300  Gross per 24 hour   Intake --   Output 1100 ml   Net -1100 ml Wt Readings from Last 3 Encounters:   09/28/22 235 lb 8 oz (106.8 kg)       General appearance:  Appears comfortable. AAOx3  HEENT: atraumatic, Pupils equal, muscous membranes moist, no masses appreciated  Cardiovascular: Regular rate and rhythm no murmurs appreciated  Respiratory: CTAB no wheezing  Gastrointestinal: Abdomen soft, non-tender, BS+  EXT: no edema  Neurology: no gross focal deficts  Psychiatry: Appropriate affect. Not agitated  Skin: Warm, dry, no rashes appreciated    Labs and Tests:  CBC:   Recent Labs     09/27/22  1039 09/28/22  0548   WBC 6.6 5.7   HGB 14.0 13.5    230     BMP:    Recent Labs     09/27/22  1039 09/28/22  0548    136   K 4.1 3.8    103   CO2 24 21   BUN 11 20   CREATININE 0.8* 1.0   GLUCOSE 328* 354*     Hepatic:   Recent Labs     09/27/22  1039   AST 16   ALT 29   BILITOT 1.1*   ALKPHOS 90     CT CHEST PULMONARY EMBOLISM W CONTRAST   Final Result   No evidence of pulmonary embolism or aortic dissection. Patchy ground-glass infiltrates within both lungs, greatest within the right   upper lobe, most compatible with multifocal pneumonia. Consider both typical   and atypical etiologies, including viral pneumonia. RECOMMENDATIONS:   Unavailable         XR CHEST (2 VW)   Final Result   No acute cardiopulmonary findings. CT HEAD WO CONTRAST    (Results Pending)   VL Renal Arterial Duplex Complete    (Results Pending)       Recent imaging reviewed    Problem List  Principal Problem:    Hypertensive urgency  Resolved Problems:    * No resolved hospital problems.  *       Assessment/Plan:   Dyspnea secondary to possible multifocal pna vs unspecifed heart failure exacerbation  - iv lasix  -procal neg stop atbx  - echo     Hypertensive emergency:   - iv labetolol, add norvasc and losartan  - nephro consult     Hyperglycemia: check a1c pending  - incrase lantus and lispro        DVT prophylaxis lovenox  Code status full code      Antonio Wiggins MD 9/28/2022 11:30 AM

## 2022-09-29 ENCOUNTER — APPOINTMENT (OUTPATIENT)
Dept: GENERAL RADIOLOGY | Age: 47
DRG: 193 | End: 2022-09-29
Payer: COMMERCIAL

## 2022-09-29 PROBLEM — I42.9 CARDIOMYOPATHY (HCC): Status: ACTIVE | Noted: 2022-09-29

## 2022-09-29 PROBLEM — E11.9 TYPE 2 DIABETES MELLITUS, WITHOUT LONG-TERM CURRENT USE OF INSULIN (HCC): Status: ACTIVE | Noted: 2022-09-29

## 2022-09-29 PROBLEM — I16.1 HYPERTENSIVE EMERGENCY: Status: ACTIVE | Noted: 2022-09-29

## 2022-09-29 LAB
ANION GAP SERPL CALCULATED.3IONS-SCNC: 12 MMOL/L (ref 3–16)
BASOPHILS ABSOLUTE: 0.1 K/UL (ref 0–0.2)
BASOPHILS RELATIVE PERCENT: 0.8 %
BUN BLDV-MCNC: 24 MG/DL (ref 7–20)
CALCIUM SERPL-MCNC: 8.6 MG/DL (ref 8.3–10.6)
CHLORIDE BLD-SCNC: 105 MMOL/L (ref 99–110)
CO2: 23 MMOL/L (ref 21–32)
CREAT SERPL-MCNC: 0.8 MG/DL (ref 0.9–1.3)
CREATININE URINE: 272.7 MG/DL (ref 39–259)
EOSINOPHILS ABSOLUTE: 0.1 K/UL (ref 0–0.6)
EOSINOPHILS RELATIVE PERCENT: 2.1 %
GFR AFRICAN AMERICAN: >60
GFR NON-AFRICAN AMERICAN: >60
GLUCOSE BLD-MCNC: 177 MG/DL (ref 70–99)
GLUCOSE BLD-MCNC: 238 MG/DL (ref 70–99)
GLUCOSE BLD-MCNC: 285 MG/DL (ref 70–99)
GLUCOSE BLD-MCNC: 308 MG/DL (ref 70–99)
GLUCOSE BLD-MCNC: 350 MG/DL (ref 70–99)
HCT VFR BLD CALC: 43.4 % (ref 40.5–52.5)
HEMOGLOBIN: 14.1 G/DL (ref 13.5–17.5)
LYMPHOCYTES ABSOLUTE: 1.4 K/UL (ref 1–5.1)
LYMPHOCYTES RELATIVE PERCENT: 20.9 %
MCH RBC QN AUTO: 28.2 PG (ref 26–34)
MCHC RBC AUTO-ENTMCNC: 32.4 G/DL (ref 31–36)
MCV RBC AUTO: 86.9 FL (ref 80–100)
MONOCYTES ABSOLUTE: 0.4 K/UL (ref 0–1.3)
MONOCYTES RELATIVE PERCENT: 6.3 %
NEUTROPHILS ABSOLUTE: 4.6 K/UL (ref 1.7–7.7)
NEUTROPHILS RELATIVE PERCENT: 69.9 %
PDW BLD-RTO: 14.1 % (ref 12.4–15.4)
PERFORMED ON: ABNORMAL
PLATELET # BLD: 215 K/UL (ref 135–450)
PMV BLD AUTO: 9.4 FL (ref 5–10.5)
POTASSIUM REFLEX MAGNESIUM: 3.9 MMOL/L (ref 3.5–5.1)
PROTEIN PROTEIN: 17 MG/DL
PROTEIN/CREAT RATIO: 0.1 MG/DL
RBC # BLD: 5 M/UL (ref 4.2–5.9)
SODIUM BLD-SCNC: 140 MMOL/L (ref 136–145)
WBC # BLD: 6.6 K/UL (ref 4–11)

## 2022-09-29 PROCEDURE — 6370000000 HC RX 637 (ALT 250 FOR IP): Performed by: INTERNAL MEDICINE

## 2022-09-29 PROCEDURE — 2060000000 HC ICU INTERMEDIATE R&B

## 2022-09-29 PROCEDURE — 80048 BASIC METABOLIC PNL TOTAL CA: CPT

## 2022-09-29 PROCEDURE — 2580000003 HC RX 258: Performed by: INTERNAL MEDICINE

## 2022-09-29 PROCEDURE — 93975 VASCULAR STUDY: CPT

## 2022-09-29 PROCEDURE — 6360000002 HC RX W HCPCS: Performed by: NURSE PRACTITIONER

## 2022-09-29 PROCEDURE — 99233 SBSQ HOSP IP/OBS HIGH 50: CPT | Performed by: INTERNAL MEDICINE

## 2022-09-29 PROCEDURE — 99222 1ST HOSP IP/OBS MODERATE 55: CPT | Performed by: INTERNAL MEDICINE

## 2022-09-29 PROCEDURE — 71045 X-RAY EXAM CHEST 1 VIEW: CPT

## 2022-09-29 PROCEDURE — 36415 COLL VENOUS BLD VENIPUNCTURE: CPT

## 2022-09-29 PROCEDURE — 85025 COMPLETE CBC W/AUTO DIFF WBC: CPT

## 2022-09-29 PROCEDURE — 82570 ASSAY OF URINE CREATININE: CPT

## 2022-09-29 PROCEDURE — 84156 ASSAY OF PROTEIN URINE: CPT

## 2022-09-29 PROCEDURE — 6360000002 HC RX W HCPCS: Performed by: INTERNAL MEDICINE

## 2022-09-29 RX ORDER — INSULIN LISPRO 100 [IU]/ML
10 INJECTION, SOLUTION INTRAVENOUS; SUBCUTANEOUS
Status: DISCONTINUED | OUTPATIENT
Start: 2022-09-29 | End: 2022-10-01 | Stop reason: HOSPADM

## 2022-09-29 RX ORDER — METOPROLOL TARTRATE 5 MG/5ML
5 INJECTION INTRAVENOUS EVERY 5 MIN PRN
Status: COMPLETED | OUTPATIENT
Start: 2022-09-30 | End: 2022-09-30

## 2022-09-29 RX ORDER — INSULIN GLARGINE 100 [IU]/ML
20 INJECTION, SOLUTION SUBCUTANEOUS NIGHTLY
Status: DISCONTINUED | OUTPATIENT
Start: 2022-09-29 | End: 2022-09-30

## 2022-09-29 RX ORDER — FUROSEMIDE 10 MG/ML
40 INJECTION INTRAMUSCULAR; INTRAVENOUS ONCE
Status: COMPLETED | OUTPATIENT
Start: 2022-09-29 | End: 2022-09-29

## 2022-09-29 RX ORDER — LOSARTAN POTASSIUM 100 MG/1
100 TABLET ORAL DAILY
Status: DISCONTINUED | OUTPATIENT
Start: 2022-09-29 | End: 2022-10-01 | Stop reason: HOSPADM

## 2022-09-29 RX ADMIN — Medication 10 ML: at 09:06

## 2022-09-29 RX ADMIN — Medication 10 ML: at 21:26

## 2022-09-29 RX ADMIN — INSULIN LISPRO 10 UNITS: 100 INJECTION, SOLUTION INTRAVENOUS; SUBCUTANEOUS at 18:16

## 2022-09-29 RX ADMIN — INSULIN LISPRO 8 UNITS: 100 INJECTION, SOLUTION INTRAVENOUS; SUBCUTANEOUS at 12:18

## 2022-09-29 RX ADMIN — INSULIN LISPRO 10 UNITS: 100 INJECTION, SOLUTION INTRAVENOUS; SUBCUTANEOUS at 12:18

## 2022-09-29 RX ADMIN — LOSARTAN POTASSIUM 100 MG: 100 TABLET, FILM COATED ORAL at 09:03

## 2022-09-29 RX ADMIN — SODIUM CHLORIDE, PRESERVATIVE FREE 10 ML: 5 INJECTION INTRAVENOUS at 04:47

## 2022-09-29 RX ADMIN — INSULIN LISPRO 7 UNITS: 100 INJECTION, SOLUTION INTRAVENOUS; SUBCUTANEOUS at 09:07

## 2022-09-29 RX ADMIN — METOPROLOL SUCCINATE 200 MG: 50 TABLET, EXTENDED RELEASE ORAL at 09:03

## 2022-09-29 RX ADMIN — FUROSEMIDE 40 MG: 10 INJECTION, SOLUTION INTRAMUSCULAR; INTRAVENOUS at 12:24

## 2022-09-29 RX ADMIN — KETOROLAC TROMETHAMINE 15 MG: 30 INJECTION, SOLUTION INTRAMUSCULAR at 04:46

## 2022-09-29 RX ADMIN — AMLODIPINE BESYLATE 10 MG: 5 TABLET ORAL at 21:25

## 2022-09-29 RX ADMIN — INSULIN GLARGINE 20 UNITS: 100 INJECTION, SOLUTION SUBCUTANEOUS at 21:25

## 2022-09-29 RX ADMIN — INSULIN LISPRO 4 UNITS: 100 INJECTION, SOLUTION INTRAVENOUS; SUBCUTANEOUS at 09:07

## 2022-09-29 ASSESSMENT — PAIN SCALES - GENERAL
PAINLEVEL_OUTOF10: 3
PAINLEVEL_OUTOF10: 6
PAINLEVEL_OUTOF10: 0

## 2022-09-29 ASSESSMENT — PAIN DESCRIPTION - LOCATION: LOCATION: HEAD

## 2022-09-29 ASSESSMENT — PAIN DESCRIPTION - DESCRIPTORS: DESCRIPTORS: ACHING

## 2022-09-29 NOTE — PLAN OF CARE
Pt alert & orientated x4: oxygen increased to 4L via nasal cannula to maintain oxygen saturation (room air at baseline). Pt remains steady on feet & uses call light appropriately. Vitals:    09/29/22 0000   BP: (!) 132/90   Pulse: 92   Resp: 18   Temp: 98.8 °F (37.1 °C)   SpO2: 98%     Pt understands treatment plan. Pt understood addition of amlodipine at hour of sleep. Pt has support system in place. NPO since midnight for renal doppler study. VSS.       Problem: Discharge Planning  Goal: Discharge to home or other facility with appropriate resources  Outcome: Progressing     Problem: Pain  Goal: Verbalizes/displays adequate comfort level or baseline comfort level  Outcome: Progressing     Problem: Neurosensory - Adult  Goal: Achieves maximal functionality and self care  Outcome: Progressing     Problem: Respiratory - Adult  Goal: Achieves optimal ventilation and oxygenation  Outcome: Progressing     Problem: Cardiovascular - Adult  Goal: Maintains optimal cardiac output and hemodynamic stability  Outcome: Progressing  Goal: Absence of cardiac dysrhythmias or at baseline  Outcome: Progressing     Problem: Skin/Tissue Integrity - Adult  Goal: Skin integrity remains intact  Outcome: Progressing  Goal: Oral mucous membranes remain intact  Outcome: Progressing  Goal: Incisions, wounds, or drain sites healing without S/S of infection  Outcome: Progressing     Problem: Musculoskeletal - Adult  Goal: Return mobility to safest level of function  Outcome: Progressing     Problem: Gastrointestinal - Adult  Goal: Minimal or absence of nausea and vomiting  Outcome: Progressing     Problem: Genitourinary - Adult  Goal: Absence of urinary retention  Outcome: Progressing     Problem: Metabolic/Fluid and Electrolytes - Adult  Goal: Electrolytes maintained within normal limits  Outcome: Progressing  Goal: Hemodynamic stability and optimal renal function maintained  Outcome: Progressing  Goal: Glucose maintained within prescribed range  Outcome: Progressing     Problem: Hematologic - Adult  Goal: Maintains hematologic stability  Outcome: Progressing     Problem: Infection - Adult  Goal: Absence of infection at discharge  Outcome: Progressing

## 2022-09-29 NOTE — PROGRESS NOTES
Physician Progress Note      Dali Chavez  CSN #:                  [id number]  :                       1975  ADMIT DATE:       2022 9:34 AM  DISCH DATE:  RESPONDING  PROVIDER #:        Antonio Wiggins MD          QUERY TEXT:    Pt admitted with Viral Pneumonia and acute systolic HF. Pt noted to have   elevated RR, HR and low Spo2. If possible, please document in the progress   notes and discharge summary if you are evaluating and/or treating any of the   following: The medical record reflects the following:  Risk Factors: Viral Pneumonia and acute systolic HF  Clinical Indicators: RR 19--40  , HR > 100, room air Spo2 88-94%,  Spo2 86% on   2L NC,  92--98% Spo2 on 4L NC  Treatment: Oxygen therapy , IV  Lasix 60mg x 1 dose, Lasix 40mg IV x 2 doses,   HHN  Options provided:  -- Acute respiratory failure with hypoxia  -- Other - I will add my own diagnosis  -- Disagree - Not applicable / Not valid  -- Disagree - Clinically unable to determine / Unknown  -- Refer to Clinical Documentation Reviewer    PROVIDER RESPONSE TEXT:    This patient is in acute respiratory failure with hypoxia.     Query created by: Karolina Crow on 2022 1:57 PM      Electronically signed by:  Antonio Wiggins MD 2022 3:24 PM

## 2022-09-29 NOTE — PROGRESS NOTES
Office : [phone]     Fax :[phone]       Nephrology Consult Note      Patient's Name: Henok Kessler  8:40 AM  9/29/2022    Reason for Consult:  HTN urgency       Requesting Physician:  Hudson Dorsey MD      Chief Complaint:    Chief Complaint   Patient presents with    Chest Pain     Chest pain, headache, vomiting onset Sunday evening           History of Present Ilness:    Henok Kessler is a 52 y.o. male with prior history of DM2, HTN - uncontrolled , sleep apnea who was admitted with c/o chest pain, headache . He had high BP of  178/133. Has been HTN for around 10 years   Has sedentary life style     Eats high sodium foods     Has long standing uncontrolled DM 2         I/O last 3 completed shifts:  In: -   Out: 2400 [Urine:2400]    Past Medical History:   Diagnosis Date    CHF (congestive heart failure) (HCC)     Hyperlipidemia     Hypertension     Sleep apnea        Past Surgical History:   Procedure Laterality Date    HERNIA REPAIR         History reviewed. No pertinent family history. reports that he has never smoked. He has never used smokeless tobacco. He reports current alcohol use. He reports that he does not currently use drugs.         Allergies:  Azithromycin and Atorvastatin    Current Medications:    losartan (COZAAR) tablet 100 mg, Daily  guaiFENesin-codeine (GUAIFENESIN AC) 100-10 MG/5ML liquid 5 mL, Q4H PRN  amLODIPine (NORVASC) tablet 10 mg, Nightly  dextrose bolus 10% 125 mL, PRN   Or  dextrose bolus 10% 250 mL, PRN  glucagon (rDNA) injection 1 mg, PRN  dextrose 10 % infusion, Continuous PRN  insulin glargine (LANTUS) injection vial 15 Units, Nightly  insulin lispro (HUMALOG) injection vial 7 Units, TID WC  perflutren lipid microspheres (DEFINITY) injection 1.65 mg, ONCE PRN  insulin lispro (HUMALOG) injection vial 0-8 Units, TID WC  insulin lispro (HUMALOG) injection vial 0-4 Units, Nightly  metoprolol succinate (TOPROL XL) extended release tablet 200 mg, Daily  sodium chloride flush 0.9 % injection 5-40 mL, 2 times per day  sodium chloride flush 0.9 % injection 5-40 mL, PRN  0.9 % sodium chloride infusion, PRN  ondansetron (ZOFRAN-ODT) disintegrating tablet 4 mg, Q8H PRN   Or  ondansetron (ZOFRAN) injection 4 mg, Q6H PRN  polyethylene glycol (GLYCOLAX) packet 17 g, Daily PRN  acetaminophen (TYLENOL) tablet 650 mg, Q6H PRN   Or  acetaminophen (TYLENOL) suppository 650 mg, Q6H PRN      Review of Systems:   14 point ROS obtained but were negative except mentioned in HPI      Physical exam:     Vitals:  BP (!) 143/99   Pulse 88   Temp 97.8 °F (36.6 °C) (Oral)   Resp 16   Ht 5' 8\" (1.727 m)   Wt 234 lb 9.6 oz (106.4 kg)   SpO2 93%   BMI 35.67 kg/m²   Constitutional:  OAA X3 NAD  Skin: no rash, turgor wnl  Heent:  eomi, mmm  Neck: no bruits or jvd noted  Cardiovascular:  S1, S2 without m/r/g  Respiratory: CTA B without w/r/r  Abdomen:  +bs, soft, nt, nd  Ext: no  lower extremity edema  Psychiatric: mood and affect appropriate  Musculoskeletal:  Rom, muscular strength intact    Labs:  CBC:   Recent Labs     09/27/22  1039 09/28/22  0548 09/29/22  0639   WBC 6.6 5.7 6.6   HGB 14.0 13.5 14.1    230 215     BMP:    Recent Labs     09/27/22  1039 09/28/22  0548 09/29/22  0639    136 140   K 4.1 3.8 3.9    103 105   CO2 24 21 23   BUN 11 20 24*   CREATININE 0.8* 1.0 0.8*   GLUCOSE 328* 354* 308*     Ca/Mg/Phos:   Recent Labs     09/27/22  1039 09/28/22  0548 09/29/22  0639   CALCIUM 9.0 8.7 8.6     Hepatic:   Recent Labs     09/27/22  1039   AST 16   ALT 29   BILITOT 1.1*   ALKPHOS 90     Troponin:   Recent Labs     09/27/22  1039   TROPONINI <0.01         IMAGING:  CT HEAD WO CONTRAST   Final Result   No acute intracranial abnormality. CT CHEST PULMONARY EMBOLISM W CONTRAST   Final Result   No evidence of pulmonary embolism or aortic dissection. Patchy ground-glass infiltrates within both lungs, greatest within the right   upper lobe, most compatible with multifocal pneumonia. Consider both typical   and atypical etiologies, including viral pneumonia. RECOMMENDATIONS:   Unavailable         XR CHEST (2 VW)   Final Result   No acute cardiopulmonary findings. VL Renal Arterial Duplex Complete    (Results Pending)          Abdominal:Renal, VL RENAL ARTERIAL DUPLEX COMPLETE. Tech Comments   Right   There is no evidence to suggest renal artery stenosis. The right renal vein is patent. The parenchymal resistive index is within normal limits. The right kidney measured 11.48 x 6.71 cm in diameter. The right kidney measures 5.28 cm in the transverse plane. Left   There is no evidence to suggest renal artery stenosis. The left renal vein is patent. The parenchymal resistive index is within normal limits. The left kidney measured 12.76 x 6.45 cm in diameter. Assessment/Plan :      1. HTN urgency   Chest pain   Continue losartan , metoprolol. amlodipine 10 mg po at bedtime     Will increase losartan to 100 mg po daily     R/o secondary HTN   Check renin / aldosterone levels. Hypertension education given     ? Lose weight (if you are overweight)  ? Choose a diet low in fat and rich in fruits, vegetables, and low-fat dairy products  ? Reduce the amount of salt you eat, avoid sodas. ?Do something active for at least 30 minutes a day on most days of the week  ? Cut down on alcohol (if you drink more than 2 alcoholic drinks per day), if you smoke then try to quit. 3. Anemia  Hb stable. 4. Acid- base disorder. monitor                         D/w primary team      Thank you for allowing us to participate in care of [place]         Electronically signed by: Chato Leach MD, 9/29/2022, 8:40 AM      [facility] [address]  Office : [phone]  Fax :[phone]

## 2022-09-29 NOTE — CARE COORDINATION
Patient admitted with an anticipated short hospitalization length of stay. Chart reviewed and it appears that patient has minimal needs for discharge at this time. Discussed with patients nurse and requested that case management be notified if discharge needs are identified. Case management will continue to follow progress and update discharge plan as needed.       Electronically signed by DAMON Alvares on 9/29/2022 at 8:46 AM

## 2022-09-29 NOTE — CONSULTS
Nutrition Education    Provided pt with both verbal and written diet education on carb counting and low Na+ guidelines. Reviewed label reading and portion control.     Educated on low Na+ and carbohydrate counting  Learners: Patient  Readiness: Acceptance  Method: Explanation, Demonstration, and Handout  Response: Verbalizes Understanding    Steve Zhou RD, LD  Contact Number: [phone]

## 2022-09-29 NOTE — PROGRESS NOTES
[facility]    9/29/2022 11:04 AM        Name: [place] .               Admitted: 9/27/2022  Primary Care Provider: Varun Redman MD (Tel: [phone])      Subjective:    Patient lying bed shortness of breath improving cough improved no nausea vomiting fevers or chills  Reviewed interval ancillary notes    Current Medications  losartan (COZAAR) tablet 100 mg, Daily  insulin lispro (HUMALOG) injection vial 10 Units, TID WC  insulin glargine (LANTUS) injection vial 20 Units, Nightly  guaiFENesin-codeine (GUAIFENESIN AC) 100-10 MG/5ML liquid 5 mL, Q4H PRN  amLODIPine (NORVASC) tablet 10 mg, Nightly  dextrose bolus 10% 125 mL, PRN   Or  dextrose bolus 10% 250 mL, PRN  glucagon (rDNA) injection 1 mg, PRN  dextrose 10 % infusion, Continuous PRN  perflutren lipid microspheres (DEFINITY) injection 1.65 mg, ONCE PRN  insulin lispro (HUMALOG) injection vial 0-8 Units, TID WC  insulin lispro (HUMALOG) injection vial 0-4 Units, Nightly  metoprolol succinate (TOPROL XL) extended release tablet 200 mg, Daily  sodium chloride flush 0.9 % injection 5-40 mL, 2 times per day  sodium chloride flush 0.9 % injection 5-40 mL, PRN  0.9 % sodium chloride infusion, PRN  ondansetron (ZOFRAN-ODT) disintegrating tablet 4 mg, Q8H PRN   Or  ondansetron (ZOFRAN) injection 4 mg, Q6H PRN  polyethylene glycol (GLYCOLAX) packet 17 g, Daily PRN  acetaminophen (TYLENOL) tablet 650 mg, Q6H PRN   Or  acetaminophen (TYLENOL) suppository 650 mg, Q6H PRN      Objective:  BP (!) 143/99   Pulse 88   Temp 97.8 °F (36.6 °C) (Oral)   Resp 16   Ht 5' 8\" (1.727 m)   Wt 234 lb 9.6 oz (106.4 kg)   SpO2 93%   BMI 35.67 kg/m²     Intake/Output Summary (Last 24 hours) at 9/29/2022 1104  Last data filed at 9/29/2022 0900  Gross per 24 hour   Intake 240 ml   Output 850 ml   Net -610 ml        Wt Readings from Last 3 Encounters:   09/29/22 234 lb 9.6 oz (106.4 kg)       General appearance:  Appears comfortable. AAOx3  HEENT: atraumatic, Pupils equal, muscous membranes moist, no masses appreciated  Cardiovascular: Regular rate and rhythm no murmurs appreciated  Respiratory: CTAB no wheezing  Gastrointestinal: Abdomen soft, non-tender, BS+  EXT: no edema  Neurology: no gross focal deficts  Psychiatry: Appropriate affect. Not agitated  Skin: Warm, dry, no rashes appreciated    Labs and Tests:  CBC:   Recent Labs     09/27/22  1039 09/28/22  0548 09/29/22  0639   WBC 6.6 5.7 6.6   HGB 14.0 13.5 14.1    230 215       BMP:    Recent Labs     09/27/22  1039 09/28/22  0548 09/29/22  0639    136 140   K 4.1 3.8 3.9    103 105   CO2 24 21 23   BUN 11 20 24*   CREATININE 0.8* 1.0 0.8*   GLUCOSE 328* 354* 308*       Hepatic:   Recent Labs     09/27/22  1039   AST 16   ALT 29   BILITOT 1.1*   ALKPHOS 90       VL Renal Arterial Duplex Complete         CT HEAD WO CONTRAST   Final Result   No acute intracranial abnormality. CT CHEST PULMONARY EMBOLISM W CONTRAST   Final Result   No evidence of pulmonary embolism or aortic dissection. Patchy ground-glass infiltrates within both lungs, greatest within the right   upper lobe, most compatible with multifocal pneumonia. Consider both typical   and atypical etiologies, including viral pneumonia. RECOMMENDATIONS:   Unavailable         XR CHEST (2 VW)   Final Result   No acute cardiopulmonary findings. Recent imaging reviewed    Problem List  Principal Problem:    Hypertensive urgency  Resolved Problems:    * No resolved hospital problems.  *       Assessment/Plan:   Dyspnea secondary to viral pna likely and acute systolic chf ef 49%  - iv lasix  - cards consulted     Hypertensive emergency:   - titrate up meds     Hyperglycemia: a1c 14.5  - incrase lantus and lispro again  - diabetic education        DVT prophylaxis lovenox  Code status full code      Naseem Garcia MD   9/29/2022 11:04 AM

## 2022-09-29 NOTE — PROGRESS NOTES
[address] [facility]  [phone]      Reason for Consultation/Chief Complaint: \" Shortness of breath, cardiomyopathy. \"    History of Present Illness:  Daisy Horta is a 52 y.o. patient who presented to the hospital with complaints of shortness of breath. He describes onset of shortness of breath and coughing. Chest discomfort occurred with coughing. Past history of CHF about 9 years. Past Medical History:   has a past medical history of CHF (congestive heart failure) ([address] Hyperlipidemia, Hypertension, and Sleep apnea. Surgical History:   has a past surgical history that includes hernia repair. Social History:   reports that he has never smoked. He has never used smokeless tobacco. He reports current alcohol use. He reports that he does not currently use drugs. Family History:  family history is not on file. Home Medications:  Were reviewed and are listed in nursing record. and/or listed below  Prior to Admission medications    Medication Sig Start Date End Date Taking? Authorizing Provider   Empagliflozin-metFORMIN HCl ER (SYNJARDY XR) 12.5-1000 MG TB24 Take 2 tablets by mouth daily 1/14/22  Yes Historical Provider, MD   metoprolol succinate (TOPROL XL) 100 MG extended release tablet Take 200 mg by mouth daily   Yes Historical Provider, MD        Allergies:  Azithromycin and Atorvastatin     Review of Systems:   A complete review of systems has been reviewed and updated today and is negative except as noted in the history of present illness.       Physical Examination:    Vitals:    09/29/22 0745   BP: (!) 143/99   Pulse: 88   Resp: 16   Temp: 97.8 °F (36.6 °C)   SpO2: 93%    Weight: 234 lb 9.6 oz (106.4 kg)         General Appearance:  Alert, cooperative, no distress, appears stated age   Head:  Normocephalic, without obvious abnormality, atraumatic   Eyes:  EOMI, conjunctiva/corneas clear       Nose: Nares normal   Throat: Lips normal   Neck: Supple, symmetrical, trachea midline,  no carotid bruit or JVD       Lungs:   Clear to auscultation bilaterally, respirations unlabored   Chest Wall:  No tenderness or deformity   Heart:  Regular rate and rhythm, S1, S2 normal, no significant murmur, rub or gallop   Abdomen:   Soft, non-tender, bowel sounds active all four quadrants,  no masses, no organomegaly           Extremities: Extremities normal, atraumatic, no cyanosis or edema   Pulses: 2+ and symmetric   Skin: Skin color, texture, turgor normal, no rashes or lesions   Pysch: Normal mood and affect   Neurologic: Normal gross motor and sensory exam.         EKG:  I have reviewed EKG with the following interpretation:  Impression:    27-SEP-2022 09:52:01 [facility] ROUTINE RECORD  Sinus tachycardia  Possible Left atrial enlargement  Nonspecific ST and T wave abnormality    TELEMETRY (Personally reviewed by me): Sinus     Lab Data:  CBC:   Recent Labs     09/27/22  1039 09/28/22  0548 09/29/22  0639   WBC 6.6 5.7 6.6   HGB 14.0 13.5 14.1   HCT 42.9 41.7 43.4   MCV 86.6 85.7 86.9    230 215     BMP:   Recent Labs     09/27/22  1039 09/28/22  0548 09/29/22  0639    136 140   K 4.1 3.8 3.9    103 105   CO2 24 21 23   BUN 11 20 24*   CREATININE 0.8* 1.0 0.8*     LIVER PROFILE:   Recent Labs     09/27/22  1039   AST 16   ALT 29   BILITOT 1.1*   ALKPHOS 90     PT/INR: No results for input(s): PROTIME, INR in the last 72 hours. APTT: No results for input(s): APTT in the last 72 hours. BNP:  No results for input(s): BNP in the last 72 hours. Imaging/Procedures:   Echo 9/28/2022:  Summary   Normal left ventricle size. There is moderate concentric left ventricular hypertrophy. Overall left ventricular systolic function appears moderately reduced. Ejection fraction is visually estimated to be 30-35%. There is hypokinesis of the anterior, inferior, anterolateral, and inferolateral walls. Akinetic lateral wall.    Grade II diastolic dysfunction with elevated LV filling pressures. Moderate mitral regurgitation with multiple jets. The left atrium is dilated. The right ventricle is enlarged. Systolic pulmonary artery pressure (SPAP) estimated at 70 mmHg (RA pressure   15 mmHg), consistent with moderate pulmonary hypertension. The right atrium is dilated. IVC size is dilated (>2.1 cm) and collapses < 50% with respiration   consistent with elevated RA pressure (15 mmHg). Assessment/Plan:  Principal Problem:    Hypertensive urgency  Plan: Improving. Continue adjust antihypertensive medical regimen. Active Problems:    Cardiomyopathy [facility])  Plan: Likely hypertensive related. Given new onset of diabetes mellitus, along with suboptimally treated hypertension, would benefit from ischemic work-up. Discussed stress testing, coronary CTA and cardiac catheterization. Preferably cardiac catheterization would be most definitive however he would like to to proceed with coronary CTA. Type 2 diabetes mellitus, without long-term current use of insulin ([facility] [address] [address]  Plan: Per primary service. Plan:   1. Coronary CTA. 2.  Agree with increasing Toprol-XL to 200 mg daily. 3.  May need additional IV Lopressor for the purposes of coronary CTA. 4.  Continue amlodipine and losartan. May benefit more from valsartan. Thank you for allowing us to participate in the care of [place]. Further evaluation will be based upon the patient's clinical course and testing results. All questions and concerns were addressed to the patient/family. Alternatives to my treatment were discussed. The note was completed using EMR. Every effort was made to ensure accuracy; however, inadvertent computerized transcription errors may be present.     Geronimo Jones M.D.

## 2022-09-30 LAB
ANION GAP SERPL CALCULATED.3IONS-SCNC: 12 MMOL/L (ref 3–16)
BASOPHILS ABSOLUTE: 0 K/UL (ref 0–0.2)
BASOPHILS RELATIVE PERCENT: 0.8 %
BUN BLDV-MCNC: 22 MG/DL (ref 7–20)
CALCIUM SERPL-MCNC: 9 MG/DL (ref 8.3–10.6)
CHLORIDE BLD-SCNC: 105 MMOL/L (ref 99–110)
CO2: 22 MMOL/L (ref 21–32)
CREAT SERPL-MCNC: 0.8 MG/DL (ref 0.9–1.3)
EOSINOPHILS ABSOLUTE: 0.2 K/UL (ref 0–0.6)
EOSINOPHILS RELATIVE PERCENT: 3.6 %
GFR AFRICAN AMERICAN: >60
GFR NON-AFRICAN AMERICAN: >60
GLUCOSE BLD-MCNC: 161 MG/DL (ref 70–99)
GLUCOSE BLD-MCNC: 226 MG/DL (ref 70–99)
GLUCOSE BLD-MCNC: 237 MG/DL (ref 70–99)
GLUCOSE BLD-MCNC: 264 MG/DL (ref 70–99)
GLUCOSE BLD-MCNC: 275 MG/DL (ref 70–99)
HCT VFR BLD CALC: 45.4 % (ref 40.5–52.5)
HEMOGLOBIN: 14.4 G/DL (ref 13.5–17.5)
LYMPHOCYTES ABSOLUTE: 1.4 K/UL (ref 1–5.1)
LYMPHOCYTES RELATIVE PERCENT: 27.4 %
MCH RBC QN AUTO: 27.9 PG (ref 26–34)
MCHC RBC AUTO-ENTMCNC: 31.8 G/DL (ref 31–36)
MCV RBC AUTO: 87.5 FL (ref 80–100)
MONOCYTES ABSOLUTE: 0.3 K/UL (ref 0–1.3)
MONOCYTES RELATIVE PERCENT: 6.9 %
NEUTROPHILS ABSOLUTE: 3.1 K/UL (ref 1.7–7.7)
NEUTROPHILS RELATIVE PERCENT: 61.3 %
PDW BLD-RTO: 14.4 % (ref 12.4–15.4)
PERFORMED ON: ABNORMAL
PLATELET # BLD: 228 K/UL (ref 135–450)
PMV BLD AUTO: 9.7 FL (ref 5–10.5)
POTASSIUM REFLEX MAGNESIUM: 4.2 MMOL/L (ref 3.5–5.1)
RBC # BLD: 5.18 M/UL (ref 4.2–5.9)
SODIUM BLD-SCNC: 139 MMOL/L (ref 136–145)
WBC # BLD: 5 K/UL (ref 4–11)

## 2022-09-30 PROCEDURE — 2060000000 HC ICU INTERMEDIATE R&B

## 2022-09-30 PROCEDURE — 6370000000 HC RX 637 (ALT 250 FOR IP): Performed by: INTERNAL MEDICINE

## 2022-09-30 PROCEDURE — 2580000003 HC RX 258: Performed by: INTERNAL MEDICINE

## 2022-09-30 PROCEDURE — 80048 BASIC METABOLIC PNL TOTAL CA: CPT

## 2022-09-30 PROCEDURE — 85025 COMPLETE CBC W/AUTO DIFF WBC: CPT

## 2022-09-30 PROCEDURE — 6360000002 HC RX W HCPCS: Performed by: INTERNAL MEDICINE

## 2022-09-30 PROCEDURE — 99233 SBSQ HOSP IP/OBS HIGH 50: CPT | Performed by: INTERNAL MEDICINE

## 2022-09-30 PROCEDURE — 36415 COLL VENOUS BLD VENIPUNCTURE: CPT

## 2022-09-30 PROCEDURE — 94760 N-INVAS EAR/PLS OXIMETRY 1: CPT

## 2022-09-30 PROCEDURE — 2500000003 HC RX 250 WO HCPCS: Performed by: INTERNAL MEDICINE

## 2022-09-30 PROCEDURE — 6370000000 HC RX 637 (ALT 250 FOR IP): Performed by: NURSE PRACTITIONER

## 2022-09-30 RX ORDER — AMLODIPINE BESYLATE 5 MG/1
5 TABLET ORAL NIGHTLY
Status: DISCONTINUED | OUTPATIENT
Start: 2022-09-30 | End: 2022-10-01 | Stop reason: HOSPADM

## 2022-09-30 RX ORDER — INSULIN GLARGINE 100 [IU]/ML
25 INJECTION, SOLUTION SUBCUTANEOUS NIGHTLY
Status: DISCONTINUED | OUTPATIENT
Start: 2022-09-30 | End: 2022-10-01 | Stop reason: HOSPADM

## 2022-09-30 RX ORDER — LANOLIN ALCOHOL/MO/W.PET/CERES
3 CREAM (GRAM) TOPICAL NIGHTLY PRN
Status: DISCONTINUED | OUTPATIENT
Start: 2022-09-30 | End: 2022-10-01 | Stop reason: HOSPADM

## 2022-09-30 RX ORDER — FUROSEMIDE 10 MG/ML
40 INJECTION INTRAMUSCULAR; INTRAVENOUS ONCE
Status: COMPLETED | OUTPATIENT
Start: 2022-09-30 | End: 2022-09-30

## 2022-09-30 RX ADMIN — METOPROLOL TARTRATE 5 MG: 5 INJECTION INTRAVENOUS at 11:27

## 2022-09-30 RX ADMIN — Medication 10 ML: at 11:30

## 2022-09-30 RX ADMIN — INSULIN LISPRO 10 UNITS: 100 INJECTION, SOLUTION INTRAVENOUS; SUBCUTANEOUS at 18:07

## 2022-09-30 RX ADMIN — GUAIFENESIN AND CODEINE PHOSPHATE 5 ML: 100; 10 SOLUTION ORAL at 00:06

## 2022-09-30 RX ADMIN — LOSARTAN POTASSIUM 100 MG: 100 TABLET, FILM COATED ORAL at 08:15

## 2022-09-30 RX ADMIN — INSULIN GLARGINE 25 UNITS: 100 INJECTION, SOLUTION SUBCUTANEOUS at 21:38

## 2022-09-30 RX ADMIN — AMLODIPINE BESYLATE 5 MG: 5 TABLET ORAL at 21:38

## 2022-09-30 RX ADMIN — GUAIFENESIN AND CODEINE PHOSPHATE 5 ML: 100; 10 SOLUTION ORAL at 21:45

## 2022-09-30 RX ADMIN — METOPROLOL TARTRATE 5 MG: 5 INJECTION INTRAVENOUS at 12:04

## 2022-09-30 RX ADMIN — Medication 20 ML: at 21:46

## 2022-09-30 RX ADMIN — FUROSEMIDE 40 MG: 10 INJECTION, SOLUTION INTRAMUSCULAR; INTRAVENOUS at 14:32

## 2022-09-30 RX ADMIN — MELATONIN TAB 3 MG 3 MG: 3 TAB at 21:38

## 2022-09-30 RX ADMIN — METOPROLOL SUCCINATE 200 MG: 50 TABLET, EXTENDED RELEASE ORAL at 08:15

## 2022-09-30 RX ADMIN — INSULIN LISPRO 4 UNITS: 100 INJECTION, SOLUTION INTRAVENOUS; SUBCUTANEOUS at 18:07

## 2022-09-30 RX ADMIN — METOPROLOL TARTRATE 5 MG: 5 INJECTION INTRAVENOUS at 11:40

## 2022-09-30 ASSESSMENT — PAIN SCALES - GENERAL
PAINLEVEL_OUTOF10: 0
PAINLEVEL_OUTOF10: 0

## 2022-09-30 NOTE — PROGRESS NOTES
[address] Daily Progress Note      Admit Date:  9/27/2022    Chief Complaint:  SOB, Cardiomyopathy     Subjective:  Mr. Serge Santana is a 52 y.o. patient who presented to the hospital with complaints of shortness of breath. He describes onset of shortness of breath and coughing. Chest discomfort occurred with coughing. Past history of CHF about 9 years. Today he states that his SOB is improving. Has not required supplemental oxygen via nasal cannula since yesterday evening. He states that he did not sleep well and is not feeling quite right this morning because of that. He denies headache, chest pain, palpitation, nausea, and vomiting.      Objective:   /79   Pulse 84   Temp 98 °F (36.7 °C) (Oral)   Resp 18   Ht 5' 8\" (1.727 m)   Wt 234 lb 9.6 oz (106.4 kg)   SpO2 93%   BMI 35.67 kg/m²     Intake/Output Summary (Last 24 hours) at 9/30/2022 0720  Last data filed at 9/29/2022 1248  Gross per 24 hour   Intake 720 ml   Output 150 ml   Net 570 ml       Physical Exam:  General:  Awake, alert, oriented x 3, NAD  Skin:  Warm and dry  Neck:  JVD not present on exam  Chest:  normal air entry  Cardiovascular:  RRR S1S2, no S3, no murmur appreciated on ascultation   Abdomen:  Soft, ND, NT, No HSM  Extremities:  No edema    Medications:    losartan  100 mg Oral Daily    insulin lispro  10 Units SubCUTAneous TID WC    insulin glargine  20 Units SubCUTAneous Nightly    amLODIPine  10 mg Oral Nightly    insulin lispro  0-8 Units SubCUTAneous TID WC    insulin lispro  0-4 Units SubCUTAneous Nightly    metoprolol succinate  200 mg Oral Daily    sodium chloride flush  5-40 mL IntraVENous 2 times per day      dextrose      sodium chloride       metoprolol, guaiFENesin-codeine, dextrose bolus **OR** dextrose bolus, glucagon (rDNA), dextrose, perflutren lipid microspheres, sodium chloride flush, sodium chloride, ondansetron **OR** ondansetron, polyethylene glycol, acetaminophen **OR** acetaminophen    TELEMETRY (Personally reviewed by me): Sinus     Lab Data:  CBC:   Recent Labs     09/27/22  1039 09/28/22  0548 09/29/22  0639   WBC 6.6 5.7 6.6   HGB 14.0 13.5 14.1   HCT 42.9 41.7 43.4   MCV 86.6 85.7 86.9    230 215     BMP:   Recent Labs     09/27/22  1039 09/28/22  0548 09/29/22  0639    136 140   K 4.1 3.8 3.9    103 105   CO2 24 21 23   BUN 11 20 24*   CREATININE 0.8* 1.0 0.8*     LIVER PROFILE:   Recent Labs     09/27/22  1039   AST 16   ALT 29   BILITOT 1.1*   ALKPHOS 90     PT/INR: No results for input(s): PROTIME, INR in the last 72 hours. APTT: No results for input(s): APTT in the last 72 hours. BNP:  No results for input(s): BNP in the last 72 hours. Imaging/Procedures:     CTA (9/30/2022): Pending HR     Echo 9/28/2022:  Summary   Normal left ventricle size. There is moderate concentric left ventricular hypertrophy. Overall left ventricular systolic function appears moderately reduced. Ejection fraction is visually estimated to be 30-35%. There is hypokinesis of the anterior, inferior, anterolateral, and inferolateral walls. Akinetic lateral wall. Grade II diastolic dysfunction with elevated LV filling pressures. Moderate mitral regurgitation with multiple jets. The left atrium is dilated. The right ventricle is enlarged. Systolic pulmonary artery pressure (SPAP) estimated at 70 mmHg (RA pressure   15 mmHg), consistent with moderate pulmonary hypertension. The right atrium is dilated. IVC size is dilated (>2.1 cm) and collapses < 50% with respiration   consistent with elevated RA pressure (15 mmHg). Assessment/Plan:  Principal Problem:  Hypertensive urgency  Plan: Improving. Continue adjust antihypertensive medical regimen. BP this morning 133/101. Active Problems:  Cardiomyopathy [facility])  Plan: Likely hypertensive related.   Given new onset of diabetes mellitus, along with suboptimally treated hypertension, would benefit from ischemic work-up. Discussed stress testing, coronary CTA and cardiac catheterization. Preferably cardiac catheterization would be most definitive however he would like to to proceed with coronary CTA. Started on BB yesterday to lower HR, continue to monitor to optimize HR for best study. Plan for CTA today. Type 2 diabetes mellitus, without long-term current use of insulin (HCC)  Plan: HgB 14.5. Pt states this is a new diagnosis. Plan per primary team.     Hypertensive emergency  Plan: Pt presented with BP up to 200/130s, nausea, vomiting, and severe headache. Denies visual disturbances at the time. BP is improving, continue to adjust antihypertensive medical regimen.            FLORA Hines-S2 9/30/2022 7:20 AM

## 2022-09-30 NOTE — PLAN OF CARE
Problem: Discharge Planning  Goal: Discharge to home or other facility with appropriate resources  9/30/2022 1515 by Carina Coates RN  Outcome: Progressing     Problem: Pain  Goal: Verbalizes/displays adequate comfort level or baseline comfort level  9/30/2022 1515 by Carina Coates RN  Outcome: Progressing     Problem: Neurosensory - Adult  Goal: Achieves maximal functionality and self care  9/30/2022 1515 by Carina Coates RN  Outcome: Progressing     Problem: Respiratory - Adult  Goal: Achieves optimal ventilation and oxygenation  9/30/2022 1515 by Carina Coates RN  Outcome: Progressing

## 2022-09-30 NOTE — PROGRESS NOTES
Office : [phone]     Fax :[phone]       Nephrology progress  Note      Patient's Name: René Radford  1:07 PM  9/30/2022    Reason for Consult:  HTN urgency       Requesting Physician:  Bharathi Simms MD      Chief Complaint:    Chief Complaint   Patient presents with    Chest Pain     Chest pain, headache, vomiting onset Sunday evening           History of Present Ilness:    René Radford is a 52 y.o. male with prior history of DM2, HTN - uncontrolled , sleep apnea who was admitted with c/o chest pain, headache . He had high BP of  178/133. Has been HTN for around 10 years   Has sedentary life style     Eats high sodium foods     Has long standing uncontrolled DM 2       Interval hx     Feels better   BP well controlled         I/O last 3 completed shifts: In: 5 [P.O.:720]  Out: 150 [Urine:150]    Past Medical History:   Diagnosis Date    CHF (congestive heart failure) (HCC)     Hyperlipidemia     Hypertension     Sleep apnea        Past Surgical History:   Procedure Laterality Date    HERNIA REPAIR         History reviewed. No pertinent family history. reports that he has never smoked. He has never used smokeless tobacco. He reports current alcohol use. He reports that he does not currently use drugs.         Allergies:  Azithromycin and Atorvastatin    Current Medications:    insulin glargine (LANTUS) injection vial 25 Units, Nightly  furosemide (LASIX) injection 40 mg, Once  losartan (COZAAR) tablet 100 mg, Daily  insulin lispro (HUMALOG) injection vial 10 Units, TID WC  guaiFENesin-codeine (GUAIFENESIN AC) 100-10 MG/5ML liquid 5 mL, Q4H PRN  amLODIPine (NORVASC) tablet 10 mg, Nightly  dextrose bolus 10% 125 mL, PRN   Or  dextrose bolus 10% 250 mL, PRN  glucagon (rDNA) injection 1 mg, PRN  dextrose 10 % infusion, Continuous PRN  perflutren lipid microspheres (DEFINITY) injection 1.65 mg, ONCE PRN  insulin lispro (HUMALOG) injection vial 0-8 Units, TID WC  insulin lispro (HUMALOG) injection vial 0-4 Units, Nightly  metoprolol succinate (TOPROL XL) extended release tablet 200 mg, Daily  sodium chloride flush 0.9 % injection 5-40 mL, 2 times per day  sodium chloride flush 0.9 % injection 5-40 mL, PRN  0.9 % sodium chloride infusion, PRN  ondansetron (ZOFRAN-ODT) disintegrating tablet 4 mg, Q8H PRN   Or  ondansetron (ZOFRAN) injection 4 mg, Q6H PRN  polyethylene glycol (GLYCOLAX) packet 17 g, Daily PRN  acetaminophen (TYLENOL) tablet 650 mg, Q6H PRN   Or  acetaminophen (TYLENOL) suppository 650 mg, Q6H PRN      Review of Systems:   14 point ROS obtained but were negative except mentioned in HPI      Physical exam:     Vitals:  /78   Pulse 82   Temp 97.7 °F (36.5 °C) (Oral)   Resp 18   Ht 5' 8\" (1.727 m)   Wt 235 lb 9.6 oz (106.9 kg)   SpO2 94%   BMI 35.82 kg/m²   Constitutional:  OAA X3 NAD  Skin: no rash, turgor wnl  Heent:  eomi, mmm  Neck: no bruits or jvd noted  Cardiovascular:  S1, S2 without m/r/g  Respiratory: CTA B without w/r/r  Abdomen:  +bs, soft, nt, nd  Ext: no  lower extremity edema  Psychiatric: mood and affect appropriate  Musculoskeletal:  Rom, muscular strength intact    Labs:  CBC:   Recent Labs     09/28/22  0548 09/29/22  0639 09/30/22  0811   WBC 5.7 6.6 5.0   HGB 13.5 14.1 14.4    215 228     BMP:    Recent Labs     09/28/22  0548 09/29/22  0639 09/30/22  0811    140 139   K 3.8 3.9 4.2    105 105   CO2 21 23 22   BUN 20 24* 22*   CREATININE 1.0 0.8* 0.8*   GLUCOSE 354* 308* 275*     Ca/Mg/Phos:   Recent Labs     09/28/22  0548 09/29/22  0639 09/30/22  0811   CALCIUM 8.7 8.6 9.0     Hepatic:   No results for input(s): AST, ALT, ALB, BILITOT, ALKPHOS in the last 72 hours.     Troponin:   No results for input(s): TROPONINI in the last 72 hours. IMAGING:  XR CHEST PORTABLE   Final Result   Patchy bilateral airspace infiltrates most prominent in the right upper lobe   compatible with multifocal pneumonia. VL Renal Arterial Duplex Complete         CT HEAD WO CONTRAST   Final Result   No acute intracranial abnormality. CT CHEST PULMONARY EMBOLISM W CONTRAST   Final Result   No evidence of pulmonary embolism or aortic dissection. Patchy ground-glass infiltrates within both lungs, greatest within the right   upper lobe, most compatible with multifocal pneumonia. Consider both typical   and atypical etiologies, including viral pneumonia. RECOMMENDATIONS:   Unavailable         XR CHEST (2 VW)   Final Result   No acute cardiopulmonary findings. CTA CARDIAC W C STRC MORP W CONTRAST    (Results Pending)          Abdominal:Renal, VL RENAL ARTERIAL DUPLEX COMPLETE. Tech Comments   Right   There is no evidence to suggest renal artery stenosis. The right renal vein is patent. The parenchymal resistive index is within normal limits. The right kidney measured 11.48 x 6.71 cm in diameter. The right kidney measures 5.28 cm in the transverse plane. Left   There is no evidence to suggest renal artery stenosis. The left renal vein is patent. The parenchymal resistive index is within normal limits. The left kidney measured 12.76 x 6.45 cm in diameter. Assessment/Plan :      1. HTN urgency   Chest pain   Continue losartan , metoprolol. Decrease amlodipine to 5 mg po at bedtime     Will increase losartan to 100 mg po daily     R/o secondary HTN   Check renin / aldosterone levels. Hypertension education given     ? Lose weight (if you are overweight)  ? Choose a diet low in fat and rich in fruits, vegetables, and low-fat dairy products  ? Reduce the amount of salt you eat, avoid sodas. ?Do something active for at least 30 minutes a day on most days of the week  ? Cut down on alcohol (if you drink more than 2 alcoholic drinks per day), if you smoke then try to quit. 3. Anemia  Hb stable. 4. Acid- base disorder. monitor                         D/w primary team      Thank you for allowing us to participate in care of [place]         Electronically signed by: Rodger Wan MD, 9/30/2022, 1:07 PM      [facility] [address]  Office : [phone]  Fax :[phone]

## 2022-09-30 NOTE — PROGRESS NOTES
[facility]    9/30/2022 10:20 AM        Name: Miller .               Admitted: 9/27/2022  Primary Care Provider: Abisai Fuller MD (Tel: [phone])      Subjective:    Shortness of breath greatly improved cough is improved no nausea vomiting fevers or chills  Reviewed interval ancillary notes    Current Medications  insulin glargine (LANTUS) injection vial 25 Units, Nightly  furosemide (LASIX) injection 40 mg, Once  losartan (COZAAR) tablet 100 mg, Daily  insulin lispro (HUMALOG) injection vial 10 Units, TID WC  metoprolol (LOPRESSOR) injection 5 mg, Q5 Min PRN  guaiFENesin-codeine (GUAIFENESIN AC) 100-10 MG/5ML liquid 5 mL, Q4H PRN  amLODIPine (NORVASC) tablet 10 mg, Nightly  dextrose bolus 10% 125 mL, PRN   Or  dextrose bolus 10% 250 mL, PRN  glucagon (rDNA) injection 1 mg, PRN  dextrose 10 % infusion, Continuous PRN  perflutren lipid microspheres (DEFINITY) injection 1.65 mg, ONCE PRN  insulin lispro (HUMALOG) injection vial 0-8 Units, TID WC  insulin lispro (HUMALOG) injection vial 0-4 Units, Nightly  metoprolol succinate (TOPROL XL) extended release tablet 200 mg, Daily  sodium chloride flush 0.9 % injection 5-40 mL, 2 times per day  sodium chloride flush 0.9 % injection 5-40 mL, PRN  0.9 % sodium chloride infusion, PRN  ondansetron (ZOFRAN-ODT) disintegrating tablet 4 mg, Q8H PRN   Or  ondansetron (ZOFRAN) injection 4 mg, Q6H PRN  polyethylene glycol (GLYCOLAX) packet 17 g, Daily PRN  acetaminophen (TYLENOL) tablet 650 mg, Q6H PRN   Or  acetaminophen (TYLENOL) suppository 650 mg, Q6H PRN      Objective:  BP (!) 142/90   Pulse 87   Temp 98.2 °F (36.8 °C) (Oral)   Resp 18   Ht 5' 8\" (1.727 m)   Wt 235 lb 9.6 oz (106.9 kg)   SpO2 91%   BMI 35.82 kg/m²     Intake/Output Summary (Last 24 hours) at 9/30/2022 1020  Last data filed at 9/30/2022 0812  Gross per 24 hour   Intake 480 ml   Output 525 ml Net -45 ml        Wt Readings from Last 3 Encounters:   09/30/22 235 lb 9.6 oz (106.9 kg)       General appearance:  Appears comfortable. AAOx3  HEENT: atraumatic, Pupils equal, muscous membranes moist, no masses appreciated  Cardiovascular: Regular rate and rhythm no murmurs appreciated  Respiratory: CTAB no wheezing  Gastrointestinal: Abdomen soft, non-tender, BS+  EXT: no edema  Neurology: no gross focal deficts  Psychiatry: Appropriate affect. Not agitated  Skin: Warm, dry, no rashes appreciated    Labs and Tests:  CBC:   Recent Labs     09/28/22  0548 09/29/22  0639 09/30/22  0811   WBC 5.7 6.6 5.0   HGB 13.5 14.1 14.4    215 228       BMP:    Recent Labs     09/28/22  0548 09/29/22  0639 09/30/22  0811    140 139   K 3.8 3.9 4.2    105 105   CO2 21 23 22   BUN 20 24* 22*   CREATININE 1.0 0.8* 0.8*   GLUCOSE 354* 308* 275*       Hepatic:   Recent Labs     09/27/22  1039   AST 16   ALT 29   BILITOT 1.1*   ALKPHOS 90       XR CHEST PORTABLE   Final Result   Patchy bilateral airspace infiltrates most prominent in the right upper lobe   compatible with multifocal pneumonia. VL Renal Arterial Duplex Complete         CT HEAD WO CONTRAST   Final Result   No acute intracranial abnormality. CT CHEST PULMONARY EMBOLISM W CONTRAST   Final Result   No evidence of pulmonary embolism or aortic dissection. Patchy ground-glass infiltrates within both lungs, greatest within the right   upper lobe, most compatible with multifocal pneumonia. Consider both typical   and atypical etiologies, including viral pneumonia. RECOMMENDATIONS:   Unavailable         XR CHEST (2 VW)   Final Result   No acute cardiopulmonary findings.          CTA [address]    (Results Pending)       Recent imaging reviewed    Problem List  Principal Problem:    Hypertensive urgency  Active Problems:    Cardiomyopathy ([address]    Type 2 diabetes mellitus, without long-term current use of insulin [facility])    Hypertensive emergency  Resolved Problems:    * No resolved hospital problems.  *       Assessment/Plan:   Dyspnea secondary to viral pna likely and acute systolic chf ef 41%  - iv lasix again today repeat bmp in am  - cta per cards today     Hypertensive emergency:   - improved     Hyperglycemia: a1c 14.5  -tirate up lantus and lispro again        DVT prophylaxis lovenox  Code status full code      Iza Justice MD   9/30/2022 10:20 AM

## 2022-09-30 NOTE — PLAN OF CARE
Pt denies pain & denies shortness of breath. Pt weaned to room air; pt remains steady on feet. Pt understands treatment plan. NPO since 0000. Pt has requested financial consult for new medications; pt will not be able to fill any new prescriptions until he receives his next paycheck; pt would appreciate new prescriptions delivered to bedside prior to discharge. Blood pressure elevated. This RN reinforced teaching related to diabetic diet and importance of daily weights.       Problem: Discharge Planning  Goal: Discharge to home or other facility with appropriate resources  Outcome: Progressing     Problem: Pain  Goal: Verbalizes/displays adequate comfort level or baseline comfort level  Outcome: Progressing     Problem: Neurosensory - Adult  Goal: Achieves maximal functionality and self care  Outcome: Progressing     Problem: Respiratory - Adult  Goal: Achieves optimal ventilation and oxygenation  Outcome: Progressing     Problem: Cardiovascular - Adult  Goal: Maintains optimal cardiac output and hemodynamic stability  Outcome: Progressing  Goal: Absence of cardiac dysrhythmias or at baseline  Outcome: Progressing     Problem: Skin/Tissue Integrity - Adult  Goal: Skin integrity remains intact  Outcome: Progressing  Goal: Oral mucous membranes remain intact  Outcome: Progressing  Goal: Incisions, wounds, or drain sites healing without S/S of infection  Outcome: Progressing     Problem: Musculoskeletal - Adult  Goal: Return mobility to safest level of function  Outcome: Progressing     Problem: Gastrointestinal - Adult  Goal: Minimal or absence of nausea and vomiting  Outcome: Progressing     Problem: Genitourinary - Adult  Goal: Absence of urinary retention  Outcome: Progressing     Problem: Metabolic/Fluid and Electrolytes - Adult  Goal: Electrolytes maintained within normal limits  Outcome: Progressing  Goal: Hemodynamic stability and optimal renal function maintained  Outcome: Progressing  Goal: Glucose maintained within prescribed range  Outcome: Progressing     Problem: Hematologic - Adult  Goal: Maintains hematologic stability  Outcome: Progressing     Problem: Infection - Adult  Goal: Absence of infection at discharge  Outcome: Progressing     Problem: Safety - Adult  Goal: Free from fall injury  Outcome: Progressing

## 2022-10-01 VITALS
HEIGHT: 68 IN | BODY MASS INDEX: 35.71 KG/M2 | OXYGEN SATURATION: 98 % | SYSTOLIC BLOOD PRESSURE: 110 MMHG | WEIGHT: 235.6 LBS | RESPIRATION RATE: 16 BRPM | TEMPERATURE: 97.9 F | HEART RATE: 82 BPM | DIASTOLIC BLOOD PRESSURE: 80 MMHG

## 2022-10-01 PROBLEM — I50.42 CHRONIC COMBINED SYSTOLIC AND DIASTOLIC CONGESTIVE HEART FAILURE (HCC): Status: ACTIVE | Noted: 2022-10-01

## 2022-10-01 LAB
ANION GAP SERPL CALCULATED.3IONS-SCNC: 13 MMOL/L (ref 3–16)
BASOPHILS ABSOLUTE: 0.1 K/UL (ref 0–0.2)
BASOPHILS RELATIVE PERCENT: 1.1 %
BUN BLDV-MCNC: 20 MG/DL (ref 7–20)
CALCIUM SERPL-MCNC: 8.5 MG/DL (ref 8.3–10.6)
CHLORIDE BLD-SCNC: 107 MMOL/L (ref 99–110)
CO2: 23 MMOL/L (ref 21–32)
CREAT SERPL-MCNC: 1.2 MG/DL (ref 0.9–1.3)
EOSINOPHILS ABSOLUTE: 0.1 K/UL (ref 0–0.6)
EOSINOPHILS RELATIVE PERCENT: 2.6 %
GFR AFRICAN AMERICAN: >60
GFR NON-AFRICAN AMERICAN: >60
GLUCOSE BLD-MCNC: 115 MG/DL (ref 70–99)
GLUCOSE BLD-MCNC: 135 MG/DL (ref 70–99)
GLUCOSE BLD-MCNC: 258 MG/DL (ref 70–99)
HCT VFR BLD CALC: 42.5 % (ref 40.5–52.5)
HEMOGLOBIN: 14 G/DL (ref 13.5–17.5)
LV EF: 31 %
LVEF MODALITY: NORMAL
LYMPHOCYTES ABSOLUTE: 1.6 K/UL (ref 1–5.1)
LYMPHOCYTES RELATIVE PERCENT: 29.1 %
MAGNESIUM: 1.9 MG/DL (ref 1.8–2.4)
MCH RBC QN AUTO: 28.5 PG (ref 26–34)
MCHC RBC AUTO-ENTMCNC: 33 G/DL (ref 31–36)
MCV RBC AUTO: 86.4 FL (ref 80–100)
MONOCYTES ABSOLUTE: 0.4 K/UL (ref 0–1.3)
MONOCYTES RELATIVE PERCENT: 7.1 %
NEUTROPHILS ABSOLUTE: 3.3 K/UL (ref 1.7–7.7)
NEUTROPHILS RELATIVE PERCENT: 60.1 %
PDW BLD-RTO: 14.1 % (ref 12.4–15.4)
PERFORMED ON: ABNORMAL
PERFORMED ON: ABNORMAL
PLATELET # BLD: 265 K/UL (ref 135–450)
PMV BLD AUTO: 9.1 FL (ref 5–10.5)
POTASSIUM REFLEX MAGNESIUM: 3.4 MMOL/L (ref 3.5–5.1)
RBC # BLD: 4.91 M/UL (ref 4.2–5.9)
SODIUM BLD-SCNC: 143 MMOL/L (ref 136–145)
WBC # BLD: 5.5 K/UL (ref 4–11)

## 2022-10-01 PROCEDURE — A9502 TC99M TETROFOSMIN: HCPCS | Performed by: INTERNAL MEDICINE

## 2022-10-01 PROCEDURE — 36415 COLL VENOUS BLD VENIPUNCTURE: CPT

## 2022-10-01 PROCEDURE — 83735 ASSAY OF MAGNESIUM: CPT

## 2022-10-01 PROCEDURE — 99233 SBSQ HOSP IP/OBS HIGH 50: CPT | Performed by: NURSE PRACTITIONER

## 2022-10-01 PROCEDURE — 85025 COMPLETE CBC W/AUTO DIFF WBC: CPT

## 2022-10-01 PROCEDURE — 94760 N-INVAS EAR/PLS OXIMETRY 1: CPT

## 2022-10-01 PROCEDURE — 99232 SBSQ HOSP IP/OBS MODERATE 35: CPT | Performed by: INTERNAL MEDICINE

## 2022-10-01 PROCEDURE — 6360000002 HC RX W HCPCS: Performed by: INTERNAL MEDICINE

## 2022-10-01 PROCEDURE — 80048 BASIC METABOLIC PNL TOTAL CA: CPT

## 2022-10-01 PROCEDURE — 93017 CV STRESS TEST TRACING ONLY: CPT | Performed by: INTERNAL MEDICINE

## 2022-10-01 PROCEDURE — 6370000000 HC RX 637 (ALT 250 FOR IP): Performed by: INTERNAL MEDICINE

## 2022-10-01 PROCEDURE — 3430000000 HC RX DIAGNOSTIC RADIOPHARMACEUTICAL: Performed by: INTERNAL MEDICINE

## 2022-10-01 PROCEDURE — 78452 HT MUSCLE IMAGE SPECT MULT: CPT | Performed by: INTERNAL MEDICINE

## 2022-10-01 RX ORDER — AMLODIPINE BESYLATE 5 MG/1
5 TABLET ORAL NIGHTLY
Qty: 30 TABLET | Refills: 3 | Status: SHIPPED | OUTPATIENT
Start: 2022-10-01 | End: 2022-10-01 | Stop reason: SDUPTHER

## 2022-10-01 RX ORDER — FUROSEMIDE 40 MG/1
40 TABLET ORAL DAILY
Qty: 60 TABLET | Refills: 3 | Status: SHIPPED | OUTPATIENT
Start: 2022-10-01 | End: 2022-10-01 | Stop reason: SDUPTHER

## 2022-10-01 RX ORDER — INSULIN GLARGINE 100 [IU]/ML
25 INJECTION, SOLUTION SUBCUTANEOUS NIGHTLY
Qty: 10 ML | Refills: 3 | Status: SHIPPED | OUTPATIENT
Start: 2022-10-01 | End: 2022-10-01 | Stop reason: HOSPADM

## 2022-10-01 RX ORDER — POTASSIUM CHLORIDE 20 MEQ/1
40 TABLET, EXTENDED RELEASE ORAL ONCE
Status: COMPLETED | OUTPATIENT
Start: 2022-10-01 | End: 2022-10-01

## 2022-10-01 RX ORDER — METOPROLOL SUCCINATE 200 MG/1
200 TABLET, EXTENDED RELEASE ORAL DAILY
Qty: 30 TABLET | Refills: 3 | Status: SHIPPED | OUTPATIENT
Start: 2022-10-01

## 2022-10-01 RX ORDER — SPIRONOLACTONE 25 MG/1
12.5 TABLET ORAL DAILY
Qty: 30 TABLET | Refills: 3 | OUTPATIENT
Start: 2022-10-01

## 2022-10-01 RX ORDER — PEN NEEDLE, DIABETIC 31 G X1/4"
1 NEEDLE, DISPOSABLE MISCELLANEOUS DAILY
Qty: 100 EACH | Refills: 3 | Status: SHIPPED | OUTPATIENT
Start: 2022-10-01

## 2022-10-01 RX ORDER — BLOOD-GLUCOSE METER
1 KIT MISCELLANEOUS DAILY
Qty: 1 KIT | Refills: 0 | Status: SHIPPED | OUTPATIENT
Start: 2022-10-01

## 2022-10-01 RX ORDER — LOSARTAN POTASSIUM 100 MG/1
100 TABLET ORAL DAILY
Qty: 30 TABLET | Refills: 3 | Status: SHIPPED | OUTPATIENT
Start: 2022-10-01 | End: 2022-10-05 | Stop reason: ALTCHOICE

## 2022-10-01 RX ORDER — INSULIN LISPRO 100 [IU]/ML
10 INJECTION, SOLUTION INTRAVENOUS; SUBCUTANEOUS
Qty: 3 EACH | Refills: 0 | Status: SHIPPED | OUTPATIENT
Start: 2022-10-01 | End: 2022-10-01 | Stop reason: HOSPADM

## 2022-10-01 RX ORDER — LOSARTAN POTASSIUM 100 MG/1
100 TABLET ORAL DAILY
Qty: 30 TABLET | Refills: 3 | Status: SHIPPED | OUTPATIENT
Start: 2022-10-01 | End: 2022-10-01 | Stop reason: SDUPTHER

## 2022-10-01 RX ORDER — AMLODIPINE BESYLATE 5 MG/1
5 TABLET ORAL NIGHTLY
Qty: 30 TABLET | Refills: 3 | Status: SHIPPED | OUTPATIENT
Start: 2022-10-01

## 2022-10-01 RX ORDER — INSULIN GLARGINE-YFGN 100 [IU]/ML
25 INJECTION, SOLUTION SUBCUTANEOUS NIGHTLY
Qty: 200 ML | Refills: 0 | Status: SHIPPED | OUTPATIENT
Start: 2022-10-01

## 2022-10-01 RX ORDER — FUROSEMIDE 40 MG/1
40 TABLET ORAL DAILY
Qty: 60 TABLET | Refills: 3 | Status: SHIPPED | OUTPATIENT
Start: 2022-10-01

## 2022-10-01 RX ORDER — INSULIN LISPRO 100 [IU]/ML
10 INJECTION, SOLUTION INTRAVENOUS; SUBCUTANEOUS
Qty: 5 ADJUSTABLE DOSE PRE-FILLED PEN SYRINGE | Refills: 0 | Status: SHIPPED | OUTPATIENT
Start: 2022-10-01

## 2022-10-01 RX ORDER — METOPROLOL SUCCINATE 200 MG/1
200 TABLET, EXTENDED RELEASE ORAL DAILY
Qty: 30 TABLET | Refills: 3 | Status: SHIPPED | OUTPATIENT
Start: 2022-10-01 | End: 2022-10-01 | Stop reason: SDUPTHER

## 2022-10-01 RX ADMIN — REGADENOSON 0.4 MG: 0.08 INJECTION, SOLUTION INTRAVENOUS at 09:25

## 2022-10-01 RX ADMIN — TETROFOSMIN 10 MILLICURIE: 1.38 INJECTION, POWDER, LYOPHILIZED, FOR SOLUTION INTRAVENOUS at 07:32

## 2022-10-01 RX ADMIN — METOPROLOL SUCCINATE 200 MG: 50 TABLET, EXTENDED RELEASE ORAL at 13:12

## 2022-10-01 RX ADMIN — POTASSIUM CHLORIDE 40 MEQ: 1500 TABLET, EXTENDED RELEASE ORAL at 13:11

## 2022-10-01 RX ADMIN — TETROFOSMIN 30 MILLICURIE: 1.38 INJECTION, POWDER, LYOPHILIZED, FOR SOLUTION INTRAVENOUS at 09:41

## 2022-10-01 RX ADMIN — INSULIN LISPRO 10 UNITS: 100 INJECTION, SOLUTION INTRAVENOUS; SUBCUTANEOUS at 14:15

## 2022-10-01 RX ADMIN — LOSARTAN POTASSIUM 100 MG: 100 TABLET, FILM COATED ORAL at 13:11

## 2022-10-01 ASSESSMENT — PAIN SCALES - GENERAL: PAINLEVEL_OUTOF10: 0

## 2022-10-01 NOTE — PROGRESS NOTES
[address]  HEART FAILURE  Progress Note      Admit Date 9/27/2022     Reason for Consult:      Reason for Consultation/Chief Complaint: SOB    HPI:    Jcarlos Yin is a 52 y.o. male with PMH HTN, HLD, DAVID, HFpEF admitted with SOB and HTN emergency. Echo with new low EF, ST ok today      Subjective:  Patient is being seen for new CMP. There were no acute overnight cardiac events. Today Mr. Shanna Neal denies chest pain, shortness of breath, palpitations and is feeling better. I spent 10 minutes educating the patient on heart failure, medications, lifestyle modifications and dietary guidelines. Review of Systems - General ROS: negative  Hematological and Lymphatic ROS: negative  Respiratory ROS: no cough, shortness of breath, or wheezing  Cardiovascular ROS: no chest pain or dyspnea on exertion  Gastrointestinal ROS: no abdominal pain, change in bowel habits, or black or bloody stools  Musculoskeletal ROS: negative  Neurological ROS: no TIA or stroke symptoms     Baseline Weight: 235   Wt Readings from Last 3 Encounters:   09/30/22 235 lb 9.6 oz (106.9 kg)         Cardiac Testing:   Echo 9/28/2022:  Summary   Normal left ventricle size. There is moderate concentric left ventricular hypertrophy. Overall left ventricular systolic function appears moderately reduced. Ejection fraction is visually estimated to be 30-35%. There is hypokinesis of the anterior, inferior, anterolateral, and inferolateral walls. Akinetic lateral wall. Grade II diastolic dysfunction with elevated LV filling pressures. Moderate mitral regurgitation with multiple jets. The left atrium is dilated. The right ventricle is enlarged. Systolic pulmonary artery pressure (SPAP) estimated at 70 mmHg (RA pressure   15 mmHg), consistent with moderate pulmonary hypertension. The right atrium is dilated.    IVC size is dilated (>2.1 cm) and collapses < 50% with respiration   consistent with elevated RA pressure (15 time, place, and person  Non-anxious    MEDICATIONS:   Scheduled Meds:   Scheduled Meds:   insulin glargine  25 Units SubCUTAneous Nightly    amLODIPine  5 mg Oral Nightly    losartan  100 mg Oral Daily    insulin lispro  10 Units SubCUTAneous TID WC    insulin lispro  0-8 Units SubCUTAneous TID WC    insulin lispro  0-4 Units SubCUTAneous Nightly    metoprolol succinate  200 mg Oral Daily    sodium chloride flush  5-40 mL IntraVENous 2 times per day     Continuous Infusions:   dextrose      sodium chloride       PRN Meds:.technetium tetrofosmin, melatonin, guaiFENesin-codeine, dextrose bolus **OR** dextrose bolus, glucagon (rDNA), dextrose, perflutren lipid microspheres, sodium chloride flush, sodium chloride, ondansetron **OR** ondansetron, polyethylene glycol, acetaminophen **OR** acetaminophen  Continuous Infusions:   dextrose      sodium chloride         Intake/Output Summary (Last 24 hours) at 10/1/2022 0825  Last data filed at 9/30/2022 2147  Gross per 24 hour   Intake --   Output 850 ml   Net -850 ml       Lab Data:  CBC:   Lab Results   Component Value Date/Time    WBC 5.5 10/01/2022 06:00 AM    HGB 14.0 10/01/2022 06:00 AM     10/01/2022 06:00 AM     BMP:  Lab Results   Component Value Date/Time     10/01/2022 05:59 AM    K 3.4 10/01/2022 05:59 AM     10/01/2022 05:59 AM    CO2 23 10/01/2022 05:59 AM    BUN 20 10/01/2022 05:59 AM    CREATININE 1.2 10/01/2022 05:59 AM    GLUCOSE 135 10/01/2022 05:59 AM     INR: No results found for: INR     CARDIAC LABS  ENZYMES:No results for input(s): CKMB, CKMBINDEX, TROPONINI in the last 72 hours.     Invalid input(s): CKTOTAL;3  FASTING LIPID PANEL:No results found for: HDL, LDLDIRECT, LDLCALC, TRIG, TSH  LIVER PROFILE:  Lab Results   Component Value Date/Time    AST 16 09/27/2022 10:39 AM    ALT 29 09/27/2022 10:39 AM     BNP:   Lab Results   Component Value Date/Time    PROBNP 1,868 09/27/2022 10:39 AM     Iron Studies:  No results found for: FERRITIN  No results found for: IRON, TIBC, FERRITIN   Iron Deficiency Anemia:  No IV Iron Therapy:  No  2017 ACC/AHA HF Guidelines:   intravenous iron replacement in patients with New York Heart Association (NYHA) class II and III HF and iron deficiency(ferritin <100 ng/ml or 100-300 ng/ml if transferrin saturation <20%), to improve functional status and QoL. 1. WEIGHT: Admit Weight: 235 lb (106.6 kg)      Today  Weight: 235 lb 9.6 oz (106.9 kg)   2. I/O   Intake/Output Summary (Last 24 hours) at 10/1/2022 0825  Last data filed at 9/30/2022 2147  Gross per 24 hour   Intake --   Output 850 ml   Net -850 ml           Assessment/Plan:     CHF- compensated, discussed daily wts, fluid restrictions with pt  CMP- on BB and ARB, will most likely change to entresto at f/u, start alfredo and will f/u next week for med titration, labs and schedule echo in 90 days  HTN- improved on toprol, losartan and norvasc        The patient was seen for >35 minutes. I reviewed interval history, physical exam, review of data including labs, imaging, development and implementation of treatment plan and coordination of complex care.  More than 50% of the time was devoted to counseling the patient on their diagnoses/treatments, as well as coordination of care with the other care teams    I appreciate the opportunity of cooperating in the care of this individual.    Leah Rogers, APRN - CNP, ACNP, AGPCNP 10/1/2022, 8:25 AM  Heart Failure  [facility] [address], [address] [place] [address]  Ph: [phone]      Core Measures:   Discharge instructions:   LVEF documented:   ACEI for LV dysfunction:   Smoking Cessation:

## 2022-10-01 NOTE — PROGRESS NOTES
Pt educated on stress test protocol including risks and complications.  All questions answered at this time

## 2022-10-01 NOTE — DISCHARGE SUMMARY
Hospital Medicine Discharge Summary    Patient: Jamey Roper     Gender: male  : 1975   Age: 52 y.o. MRN: [id number]    Admitting Physician: [place] Corey MD  Discharge Physician: [place] Corey MD     Code Status: Full Code     Admit Date: 2022   Discharge Date:   10/1/2022    Disposition:  Home  Time spent arranging discharge: 35 minutes    Discharge Diagnoses: Active Hospital Problems    Diagnosis Date Noted    Cardiomyopathy [facility]) [I42.9] 2022     Priority: High    Type 2 diabetes mellitus, without long-term current use of insulin ([facility] [address] [E11.9] 2022     Priority: Medium    Hypertensive emergency [I16.1] 2022     Priority: Medium    Hypertensive urgency [I16.0] 2022     Priority: Medium         Condition at Discharge:  [address] Course: To hospital with dyspnea secondary viral pneumonia and acute systolic heart failure with EF of 35% diuresed with IV Lasix and this improved patient underwent stress test which showed no active ischemia. Patient of hypertensive emergency and with medication were titrated up and this resolved patient did have new onset diabetes with A1c of 14.5 patient was started on Lantus and lispro we will discharge advised to monitor blood sugars and follow-up with PCP to further adjust insulin as needed  Discharge Exam:    /75   Pulse 79   Temp 97.8 °F (36.6 °C) (Oral)   Resp 16   Ht 5' 8\" (1.727 m)   Wt 235 lb 9.6 oz (106.9 kg)   SpO2 94%   BMI 35.82 kg/m²   General appearance:  Appears comfortable. AAOx3  HEENT: atraumatic, Pupils equal, muscous membranes moist, no masses appreciated  Cardiovascular: Regular rate and rhythm no murmurs appreciated  Respiratory: CTAB no wheezing  Gastrointestinal: Abdomen soft, non-tender, BS+  EXT: no edema  Neurology: no gross focal deficts  Psychiatry: Appropriate affect.  Not agitated  Skin: Warm, dry, no rashes appreciated    Discharge Medications:   Current Discharge Medication List        START taking these medications    Details   amLODIPine (NORVASC) 5 MG tablet Take 1 tablet by mouth nightly  Qty: 30 tablet, Refills: 3      insulin glargine (LANTUS) 100 UNIT/ML injection vial Inject 25 Units into the skin nightly  Qty: 10 mL, Refills: 3      insulin lispro (HUMALOG) 100 UNIT/ML SOLN injection vial Inject 10 Units into the skin 3 times daily (with meals)  Qty: 3 each, Refills: 0      losartan (COZAAR) 100 MG tablet Take 1 tablet by mouth daily  Qty: 30 tablet, Refills: 3      furosemide (LASIX) 40 MG tablet Take 1 tablet by mouth daily  Qty: 60 tablet, Refills: 3      glucose monitoring (FREESTYLE FREEDOM) kit 1 kit by Does not apply route daily  Qty: 1 kit, Refills: 0           Current Discharge Medication List        CONTINUE these medications which have CHANGED    Details   metoprolol succinate (TOPROL XL) 200 MG extended release tablet Take 1 tablet by mouth daily  Qty: 30 tablet, Refills: 3           Current Discharge Medication List        CONTINUE these medications which have NOT CHANGED    Details   Empagliflozin-metFORMIN HCl ER (SYNJARDY XR) 12.5-1000 MG TB24 Take 2 tablets by mouth daily           Current Discharge Medication List          Labs:  For convenience and continuity at follow-up the following most recent labs are provided:    Lab Results   Component Value Date/Time    WBC 5.5 10/01/2022 06:00 AM    HGB 14.0 10/01/2022 06:00 AM    HCT 42.5 10/01/2022 06:00 AM    MCV 86.4 10/01/2022 06:00 AM     10/01/2022 06:00 AM     10/01/2022 05:59 AM    K 3.4 10/01/2022 05:59 AM     10/01/2022 05:59 AM    CO2 23 10/01/2022 05:59 AM    BUN 20 10/01/2022 05:59 AM    CREATININE 1.2 10/01/2022 05:59 AM    CALCIUM 8.5 10/01/2022 05:59 AM    ALKPHOS 90 09/27/2022 10:39 AM    ALT 29 09/27/2022 10:39 AM    AST 16 09/27/2022 10:39 AM    BILITOT 1.1 09/27/2022 10:39 AM    LABALBU 3.8 09/27/2022 10:39 AM     No results found for: INR    Radiology:  Echo Complete    Result Date: 9/28/2022  Transthoracic Echocardiography Report (TTE)  Demographics   Patient Name       Elieser Rasmussen   Date of Study      09/28/2022         Gender              Male   Patient Number     [id number]         Date of Birth       1975   Visit Number       [id number]          Age                 52 year(s)   Accession Number   [id number]         Room Number         9245   Corporate ID       [id number]            Anthony Chong RVT,                                                            RDCS   Ordering Physician Claire Rivero, Miguel Villegas MD,                     MD                 Physician           Dk Leblanc  Procedure Type of Study   TTE procedure:ECHOCARDIOGRAM COMPLETE 2D W DOPPLER W COLOR. Procedure Date Date: 09/28/2022 Start: 01:19 PM Study Location: [facility] - Echo Lab Technical Quality: Adequate visualization Indications:Congestive heart failure. Patient Status: Routine Height: 68 inches Weight: 235.01 pounds BSA: 2.19 m2 BMI: 35.73 kg/m2 HR: 86 bpm BP: 155/107 mmHg  Conclusions   Summary  Normal left ventricle size. There is moderate concentric left ventricular hypertrophy. Overall left ventricular systolic function appears moderately reduced. Ejection fraction is visually estimated to be 30-35%. There is hypokinesis of the anterior, inferior, anterolateral, and  inferolateral walls. Akinetic lateral wall. Grade II diastolic dysfunction with elevated LV filling pressures. Moderate mitral regurgitation with multiple jets. The left atrium is dilated. The right ventricle is enlarged. Systolic pulmonary artery pressure (SPAP) estimated at 70 mmHg (RA pressure  15 mmHg), consistent with moderate pulmonary hypertension. The right atrium is dilated.   IVC size is dilated (>2.1 cm) and collapses < 50% with respiration  consistent with elevated RA pressure (15 mmHg). M-Mode/2D Measurements (cm)   LV Diastolic Dimension: 5.2 cm LV Systolic Dimension: 9.68 cm  LV Septum Diastolic: 0.27 cm  LV PW Diastolic: 1.5 cm        AO Root Dimension: 2.7 cm                                 AV Cusp Separation: 1.8 cm                                 LA Dimension: 4.1 cm  LVOT: 2.2 cm                   LA Area: 20 cm2                                 LA volume/Index: 53 ml /24 ml/m2  Doppler Measurements   AV Peak Velocity: 129 cm/s     MV Peak E-Wave: 102 cm/s  AV Peak Gradient: 6.66 mmHg    MV Peak A-Wave: 100 cm/s  AV Mean Gradient: 4 mmHg       MV E/A Ratio: 1.02  LVOT Peak Velocity: 85.8 cm/s  MV Mean Gradient: 2 mmHg  AV Area (Continuity):2.62 cm2  MV Max P mmHg                                 MV Vmax:486 cm/s  TR Velocity:212 cm/s           MV VTI:21 cm/s  TR Gradient:17.98 mmHg                                 MV Area (continuity): 2.53 cm2  E' Septal Velocity: 6.42 cm/s  MV Deceleration Time: 142 msec  E' Lateral Velocity: 7.18 cm/s  E/E' ratio: 15.1  PV Peak Velocity: 122 cm/s  PV Peak Gradient: 5.95 mmHg   Aortic Valve   Peak Velocity: 129 cm/s     Mean Velocity: 95 cm/s  Peak Gradient: 6.66 mmHg    Mean Gradient: 4 mmHg  Area (continuity): 2.62 cm2  AV VTI: 20.3 cm   Cusp Separation: 1.8 cm  Aorta   Aortic Root: 2.7 cm  Ascending Aorta: 2.8 cm  LVOT Diameter: 2.2 cm      XR CHEST (2 VW)    Result Date: 2022  EXAMINATION: TWO XRAY VIEWS OF THE CHEST 2022 10:46 am COMPARISON: None. HISTORY: ORDERING SYSTEM PROVIDED HISTORY: SOB - cough TECHNOLOGIST PROVIDED HISTORY: Reason for exam:->SOB - cough Reason for Exam: shortness of breath, cough FINDINGS: Normal cardiomediastinal silhouette. No acute airspace infiltrate. No pneumothorax or pleural effusion. No acute cardiopulmonary findings.      CT HEAD WO CONTRAST    Result Date: 2022  EXAMINATION: CT OF THE HEAD WITHOUT CONTRAST  2022 6:04 pm TECHNIQUE: CT of the head was performed without the administration of intravenous contrast. Automated exposure control, iterative reconstruction, and/or weight based adjustment of the mA/kV was utilized to reduce the radiation dose to as low as reasonably achievable. COMPARISON: None. HISTORY: ORDERING SYSTEM PROVIDED HISTORY: r/o blled TECHNOLOGIST PROVIDED HISTORY: Reason for exam:->r/o reena Has a \"code stroke\" or \"stroke alert\" been called? ->No FINDINGS: BRAIN/VENTRICLES: There is no acute intracranial hemorrhage, mass effect or midline shift. No abnormal extra-axial fluid collection. The gray-white differentiation is maintained without evidence of an acute infarct. There is no evidence of hydrocephalus. ORBITS: The visualized portion of the orbits demonstrate no acute abnormality. SINUSES: The visualized paranasal sinuses and mastoid air cells demonstrate no acute abnormality. SOFT TISSUES/SKULL:  No acute abnormality of the visualized skull or soft tissues. No acute intracranial abnormality. XR CHEST PORTABLE    Result Date: 9/29/2022  EXAMINATION: ONE XRAY VIEW OF THE CHEST 9/29/2022 11:11 am COMPARISON: CT chest dated 27 September 2022 HISTORY: ORDERING SYSTEM PROVIDED HISTORY: dyspnea TECHNOLOGIST PROVIDED HISTORY: Reason for exam:->dyspnea FINDINGS: Patchy bilateral airspace infiltrates, most prominent in the right upper lobe. No pneumothorax or pleural effusion. Normal cardiomediastinal silhouette     Patchy bilateral airspace infiltrates most prominent in the right upper lobe compatible with multifocal pneumonia. CT CHEST PULMONARY EMBOLISM W CONTRAST    Result Date: 9/27/2022  EXAMINATION: CTA OF THE CHEST 9/27/2022 11:31 am TECHNIQUE: CTA of the chest was performed after the administration of intravenous contrast.  Multiplanar reformatted images are provided for review. MIP images are provided for review.  Automated exposure control, iterative reconstruction, and/or weight based adjustment of the mA/kV was utilized to reduce the radiation dose to as low as reasonably achievable. COMPARISON: None. HISTORY: ORDERING SYSTEM PROVIDED HISTORY: SOB suspect pulmonary embolus TECHNOLOGIST PROVIDED HISTORY: If patient is on cardiac monitor and/or pulse ox, they may be taken off cardiac monitor and pulse ox, left on O2 if currently on. All monitors reattached when patient returns to room. Reason for exam:->SOB suspect pulmonary embolus Decision Support Exception - unselect if not a suspected or confirmed emergency medical condition->Emergency Medical Condition (MA) Reason for Exam: SOB suspect pulmonary embolus FINDINGS: Pulmonary Arteries: Pulmonary arteries are adequately opacified. No filling defects are seen within the pulmonary arteries to suggest pulmonary embolism. Mediastinum: Visualized thyroid unremarkable. Shotty mediastinal lymph nodes are seen which are likely reactive. The esophagus is unremarkable. Cardiac chambers unremarkable. No pericardial effusion. Thoracic aorta normal in caliber without evidence of dissection. Lungs/pleura: Patchy ground-glass infiltrates are seen within the lungs bilaterally, greatest in the right upper lobe. A very small right pleural effusion is present. No pneumothorax is found. Airways are patent. Upper Abdomen: Limited images of the upper abdomen are unremarkable. Soft Tissues/Bones: No acute or suspicious bony abnormalities. There is mild bilateral gynecomastia. The visualized extra thoracic soft tissues otherwise unremarkable. No evidence of pulmonary embolism or aortic dissection. Patchy ground-glass infiltrates within both lungs, greatest within the right upper lobe, most compatible with multifocal pneumonia. Consider both typical and atypical etiologies, including viral pneumonia.  RECOMMENDATIONS: Unavailable     VL Renal Arterial Duplex Complete    Result Date: 9/29/2022  Vascular Renal Procedure -- PRELIMINARY SONOGRAPHER REPORT --   Demographics   Patient Name       Shannon Rosales Date of Study      09/29/2022        Gender              Male   Patient Number     [id number]        Date of Birth       1975   Visit Number       [id number]         Age                 52 year(s)   Accession Number   [id number]        Room Number         3234   Corporate ID       [id number]           Sonographer         Stanislaw Mi RVT, RT   Ordering Physician Ameena Calixto MD Interpreting        Morelia Kaplan MD                                       Physician  Procedure Type of Study:   Abdominal:Renal, VL RENAL ARTERIAL DUPLEX COMPLETE. Tech Comments Right There is no evidence to suggest renal artery stenosis. The right renal vein is patent. The parenchymal resistive index is within normal limits. The right kidney measured 11.48 x 6.71 cm in diameter. The right kidney measures 5.28 cm in the transverse plane. Left There is no evidence to suggest renal artery stenosis. The left renal vein is patent. The parenchymal resistive index is within normal limits. The left kidney measured 12.76 x 6.45 cm in diameter. The left kidney measures 4.5 cm in the transverse plane.     NM MYOCARDIAL SPECT REST EXERCISE OR RX    Result Date: 10/1/2022  Cardiac Perfusion Imaging  Demographics   Patient Name       [facility]   Date of Study      10/01/2022         Gender              Male   Patient Number     [id number]         Date of Birth       1975   Visit Number       [id number]          Age                 52 year(s)   Accession Number   [id number]         Room Number         7751   Corporate ID       [id number]            NM Technician       CARLEY Singh,          Interpreting        Grace Paz MD,                     [place], RN       Physician           Stanley Jin   Ordering Physician Bhavna Cosby MD,                     [facility]   The procedure was explained in detail to the patient. Risks,  complications and alternative treatments were reviewed. Written consent  was obtained. Procedure Procedure Type:   Nuclear Stress Test:Pharmacological, NM MYOCARDIAL SPECT REST EXERCISE OR  RX   Study location: [facility] - Nuclear Medicine   Indications: Shortness of breath. Hospital Status: Inpatient. Height: 68 inches Weight: 235 pounds  Risk Factors   The patient risk factors include:physical activity, treated and controlled  hypercholesterolemia, treated and controlled hypertension, diabetes mellitus  and dyslipidemia. Conclusions   Summary  No evidence of myocardium at risk for significant reversible ischemia. There is moderate global LV systolic dysfunction with ejection fraction of  31 %. Overall findings represent a low risk scan. Stress Protocols   Resting ECG  Normal sinus rhythm. Normal ECG. Resting HR:82 bpm   Resting BP:111/55 mmHg   Pre-stress physical exam: Denies CP/SOB  Stress Protocol:Pharmacologic - Lexiscan's  Peak HR:106 bpm                         HR/BP product:54146  Peak BP:163/76 mmHg  Predicted HR: 173 bpm  % of predicted HR: 61  Test duration: 4 min  Reason for termination:Completed   ECG Findings  Normal electrocardiographic response to exercise with less than 1.0  mm of up sloping ST depression. Arrhythmias  No significant rhythm abnormality. Symptoms  Unable to reach target heart rate r/t generalized fatigue. Developed fatigue and dizziness, symptoms likely related to [address] [place] [address] Avenue. Symptoms resolved with rest.   Complications  Procedure complication was none. Stress Interpretation  EKG portion is non-diagnostic due to inability to reach target HR .   Procedure Medications   - Lexiscan I.V. 0.4 mg.10 sec  Imaging Protocols   - One Day   Rest                          Stress   Isotope:Myoview/Tetrofosmin   Isotope: Myoview/Tetrofosmin  Isotope dose:10.08 mCi        Isotope dose:31.56 mCi  Administration Route: I.V.      Administration Route:I.V.  Date:10/01/2022 07:00         Date:10/01/2022 09:29                                 Technique:      Gated  Imaging Results    Stress ejection    Ejection fraction:31 %    EDV :272 ml    ESV :188 ml    Stroke volume :84 ml    LV mass :246 gr  Medical History   Additional Medical History   Past Medical History:  Diagnosis Date  - CHF (congestive heart failure) ([facility] [address]  - Hyperlipidemia  - Hypertension  - Sleep apnea   Past Surgical History:  Procedure Laterality Date  - HERNIA REPAIR   Signatures   ------------------------------------------------------------------  Electronically signed by Barbara Hernandez MD, Carrie Ornelas  (Interpreting physician) on 10/01/2022 at 11:06  ------------------------------------------------------------------          Signed:    Connie Hubbard MD   10/1/2022

## 2022-10-01 NOTE — DISCHARGE INSTRUCTIONS
Your information:  Name: Isabel Villarreal  : 1975    Your Discharge Instructions    What to do after you leave the hospital:    Read, review and familiarize yourself with the information provided below and in a separate packet on  Heart Failure, Insulin    Diet: Low Salt/Sodium, Carbohydrate Controlled    Recommended activity:  as tolerated  Avoid strenuous activity until instructed by your physician to resume; balance rest with periods of light to normal activity. If you experience any of the following: Unusual or inadequately controlled pain; unusual transient shortness of breath; recurrent or persistent nausea, heartburn, palpitations or lightheadedness; increased swelling; increased fatigue; fever >100; please follow up with @[email or web address]@ [unfilled] or go to the Emergency Room. Follow up: Make appointment with Cardiology for no more than 1 week. Inform the  this is for hospital follow up. Schedule an appointment with a primary care provider      Home Health/ Outpatient Services: NA      Information obtained by:  By signing below, I understand and acknowledge receipt of the instructions indicated above, and I understand that if any problems occur once I leave the hospital I am to contact [email or web address]. Learning About Heart Failure  What is heart failure? Heart failure means that your heart muscle doesn't pump as much blood as your body needs. Failure doesn't mean that your heart has stopped. It means that your heart isn't pumping as well as it should. Because your heart cannot pump well, your body tries to make up for it. To do this:  Your body holds on to salt and water. This increases the amount of blood in your bloodstream.  Your heart beats faster. Your heart might get bigger. Your body has an amazing ability to make up for heart failure. It may do such a good job that you don't know you have a disease. But at some point, your heart and body will no longer be able to keep up.  Then fluid starts to build up in your lungs and other parts of your body. This fluid buildup is called congestion. It's why some doctors call the disease congestive heart failure. What can you expect when you have heart failure? Heart failure is a lifelong (chronic) disease. Treatment may be able to slow the disease and help you feel better. But heart failure tends to get worse over time. Despite this, there are many steps you can take to feel better and stay healthy longer. Early on, your symptoms may not be too bad. As heart failure gets worse, symptoms typically get worse, and you may need to limit your activities. Heart failure can also get worse suddenly. If this happens, you need emergency care. Then, after treatment, your symptoms may go back to being stable (which means they stay the same) for a long time. Heart failure can lead to other health problems, such as heart rhythm problems. Over time, your treatment options may change, especially as your symptoms get worse. You may want to think about what kind of care you want at the end of your life. What are the symptoms? Symptoms of heart failure start to happen when your heart can't pump enough blood to the rest of your body. In the early stages of heart failure, you may:  Feel tired easily. Be short of breath when you exert yourself. Feel like your heart is pounding or racing (palpitations). Feel weak or dizzy. As heart failure gets worse, fluid starts to build up in your lungs and other parts of your body. This may cause you to:  Feel short of breath even at rest.  Have swelling (edema), especially in your legs, ankles, and feet. Gain weight. This may happen over just a day or two, or more slowly. Cough or wheeze, especially when you lie down. Feel bloated or sick to your stomach. How is heart failure treated? Heart failure is treated with medicines and steps you take to make lifestyle changes and check your symptoms.   Treatment can slow the disease and help you feel better and live longer. Christa Holcomb probably take several medicines. You'll take steps to care for yourself at home. Christa Holcomb watch for changes in your symptoms. You may need to make lifestyle changes, such as limiting sodium, getting regular exercise, not smoking, and eating healthy foods. You might attend cardiac rehabilitation (rehab) to get education and support that help you make lifestyle changes and stay as healthy as possible. You may get a heart device. A pacemaker helps your heart pump blood. An ICD can stop abnormal heart rhythms. As heart failure gets worse, palliative care can help improve your quality of life. You can do advance care planning to decide what kind of care you want at the end of your life. How can you care for yourself? There are many steps you can take to feel better and live longer. They can help you stay active and enjoy life. Take your medicine the right way. Avoid medicines that can make your symptoms worse. Check your weight and symptoms every day. Know what to do if your symptoms get worse. Limit sodium. This helps keep fluid from building up. It may help you feel better. Be active. Exercise regularly, but don't exercise too hard. Be heart-healthy. Eat healthy foods, stay at a healthy weight, limit alcohol, and don't smoke. Stay as healthy as possible. Manage other health problems such as diabetes and high blood pressure. Avoid colds and flu, get help for depression and anxiety, and manage stress. If you think you may have a problem with alcohol or drug use, talk to your doctor. Ask your doctor if you need to limit the amount of fluids you drink. Follow-up care is a key part of your treatment and safety. Be sure to make and go to all appointments, and call your doctor if you are having problems. It's also a good idea to know your test results and keep a list of the medicines you take. Where can you learn more?   Go to https://chpepiceweb.[email or web address] org and sign in to your instruMagic account. Enter A375 in the [facility] box to learn more about \"Learning About Heart Failure. \"     If you do not have an account, please click on the \"Sign Up Now\" link. Current as of: January 10, 2022               Content Version: 13.4  © 2006-2022 [email or web address]. Care instructions adapted under license by [facility] ([facility]). If you have questions about a medical condition or this instruction, always ask your healthcare professional. [address] any warranty or liability for your use of this information. Limiting Sodium With Heart Failure: Care Instructions  Your Care Instructions     Sodium causes your body to hold on to extra water. This may cause your heart failure symptoms to get worse. Limiting sodium may help you feel better. People get most of their sodium from processed foods. Fast food and restaurant meals also tend to be very high in sodium. Your doctor may suggest that you limit sodium. Your doctor can tell you how much sodium is right for you. An example is less than 3,000 mg a day. This includes all the salt you eat in cooked or packaged foods. Follow-up care is a key part of your treatment and safety. Be sure to make and go to all appointments, and call your doctor if you are having problems. It's also a good idea to know your test results and keep a list of the medicines you take. How can you care for yourself at home? Read food labels  Read food labels on cans and food packages. The labels tell you how much sodium is in each serving. Make sure that you look at the serving size. If you eat more than the serving size, you have eaten more sodium than is listed for one serving. Food labels also tell you the Percent Daily Value for sodium. Choose products with low Percent Daily Values for sodium.   Be aware that sodium can come in forms other than salt, including monosodium glutamate (MSG), sodium citrate, and sodium bicarbonate (baking soda). MSG is often added to Asian food. You can sometimes ask for food without MSG or salt. Buy low-sodium foods  Buy foods that are labeled \"unsalted\" (no salt added), \"sodium-free\" (less than 5 mg of sodium per serving), or \"low-sodium\" (140 mg or less of sodium per serving). A food labeled \"light sodium\" has less than half of the full-sodium version of that food. Foods labeled \"reduced-sodium\" may still have too much sodium. Buy fresh vegetables or plain, frozen vegetables. Buy low-sodium versions of canned vegetables, soups, and other canned goods. Prepare low-sodium meals  Use less salt each day when cooking. Reducing salt in this way will help you adjust to the taste. Do not add salt after cooking. Take the salt shaker off the table. Flavor your food with garlic, lemon juice, onion, vinegar, herbs, and spices instead of salt. Do not use soy sauce, steak sauce, onion salt, garlic salt, or ketchup on your food. Make your own salad dressings, sauces, and ketchup without adding salt. Use less salt (or none) when recipes call for it. You can often use half the salt a recipe calls for without losing flavor. Other dishes like rice, pasta, and grains do not need added salt. Rinse canned vegetables. This removes some--but not all--of the salt. Avoid water that has a naturally high sodium content or that has been treated with water softeners, which add sodium. If you buy bottled water, read the label and choose a sodium-free brand. Avoid high-sodium foods, such as:  Smoked, cured, salted, and canned meat, fish, and poultry. Ham, velazco, hot dogs, and luncheon meats. Regular, hard, and processed cheese and regular peanut butter. Crackers with salted tops. Frozen prepared meals. Canned and dried soups, broths, and bouillon, unless labeled sodium-free or low-sodium. Canned vegetables, unless labeled sodium-free or low-sodium.   Salted snack foods such as chips and pretzels. [place] fries, pizza, tacos, and other fast foods. Pickles, olives, ketchup, and other condiments, especially soy sauce, unless labeled sodium-free or low-sodium. If you cannot cook for yourself  Have family members or friends help you, or have someone cook low-sodium meals. Check with your local senior nutrition program to find out where meals are served and whether they offer a low-sodium option. You can often find these programs through your local health department or hospital.  Have meals delivered to your home. [facility] have a Meals on [employer]ingu[employer]. These programs provide one hot meal a day for older adults, delivered to their homes. Ask whether these meals are low-sodium. Let them know that you are on a low-sodium diet. Where can you learn more? Go to https://On The Spot Systemspepiceweb.Innovacell. org and sign in to your [employer] account. Enter A166 in the [employer] box to learn more about \"Limiting Sodium With Heart Failure: Care Instructions. \"     If you do not have an account, please click on the \"Sign Up Now\" link. Current as of: January 10, 2022               Content Version: 13.4  © 2006-2022 [employer]. Care instructions adapted under license by [facility] ([facility]). If you have questions about a medical condition or this instruction, always ask your healthcare professional. [place] [address] any warranty or liability for your use of this information. Learning About Self-Care for Heart Failure  What is self-care for heart failure? Heart failure usually gets worse over time. But there are many things you can do to feel better, avoid the hospital, and live longer. Self-care means managing your health by doing certain things every day, like weighing yourself. It's about knowing which symptoms to watch for so you can avoid getting worse.  When you practice good self-care, you know when it's time to call your doctor and when your heart failure has turned into an emergency. The list below can help. Top 5 self-care tips for every day   Take your medicines as prescribed. This gives them the best chance of helping you. Weigh yourself every day. This helps you watch for signs that you're getting worse. Weight gain may be a sign that your body is holding on to too much fluid. Weigh yourself at the same time each day, using the same scale, on a hard, flat surface. The best time is in the morning after you go to the bathroom and before you eat or drink anything. Keep a daily record of your symptoms. Checking your symptoms helps you see what symptoms are normal for you and if they change or get worse. Limit sodium. This helps keep fluid from building up and may help you feel better. Your doctor can tell you how much sodium is right for you. An example is less than 3,000 milligrams (mg) a day. Try limiting the salt you eat at home, and by watching for \"hidden\" sodium when you eat out or shop for food. Try to exercise throughout the week. Exercise makes your heart stronger and can help you avoid symptoms. Walking is a great way to get exercise. If your doctor says it's safe, start out with some short walks. Then slowly build up to longer ones. Some people with heart failure also may need to limit how much fluid they drink each day. Ask your doctor if this is true for you and, if it is, how much fluid is safe for you to drink each day. When should you act? Try to become familiar with signs that mean your heart failure is getting worse. If you need help, talk with your doctor about making a personal plan. Here are some things to watch for as you practice your daily self-care. Call your doctor if:  You have sudden weight gain, such as more than 2 to 3 pounds in a day or 5 pounds in a week. (Your doctor may suggest a different range of weight gain.)  You have new or worse swelling in your feet, ankles, or legs. Your breathing gets worse. Activities that did not make you short of breath before are hard for you now. Your breathing when you lie down is worse than usual, or you wake up at night needing to catch your breath. Be sure to make and go to all of your follow-up appointments. And it's always a good idea to call your doctor anytime you have a sudden change in symptoms. When is it an emergency? Sometimes the symptoms get worse very quickly. This is called sudden heart failure. It causes fluid to build up in your lungs. Sudden heart failure is an emergency. If you have any of these symptoms, you need care right away. Call 911 if:   You have severe shortness of breath. You have an irregular or fast heartbeat. You cough up foamy, pink mucus. What else can you do to stay healthy? There are other things you can do to take care of your body and your heart. These things will help you feel better. And they will also reduce your risk of heart attack and stroke. Try to stay at a healthy weight. Eat a healthy diet with lots of fresh fruit, vegetables, and whole grains. If you smoke, quit. Limit the amount of alcohol you drink. Keep high blood pressure and diabetes under control. If you need help, talk with your doctor. If your doctor has not set you up with a cardiac rehabilitation (rehab) program, talk to him or her about whether that is right for you. Cardiac rehab includes exercise, help with diet and lifestyle changes, and emotional support. Also let your doctor know if:  You're having trouble sleeping. Sleep is important to your well-being. It also helps your heart work the way it's supposed to. Your doctor can help you decide if you need treatment for sleep problems. You're feeling sad or hopeless much of the time, or you are worried and anxious. Heart failure can be hard on your emotions. Treatment with counseling and medicine can help. And when you feel better, you're more likely to take care of yourself.   You think you may have a may also be used for purposes not listed in this medication guide. What should I discuss with my healthcare provider before using insulin glargine? You should not use this medicine if you are allergic to insulin, or if you are having an episode of hypoglycemia (low blood sugar) or diabetic ketoacidosis (call your doctor for treatment). Insulin glargine is not approved for use by anyone younger than 10years old, and some brands are for use only in adults. Do not use this medicine to treat type 2 diabetes in a child of any age. Tell your doctor if you have ever had:  liver or kidney disease; or  heart failure or other heart problems. Tell your doctor if you also take pioglitazone or rosiglitazone (sometimes contained in combinations with glimepiride or metformin). Taking certain oral diabetes medicines while you are using insulin may increase your risk of serious heart problems. Tell your doctor if you are pregnant or breastfeeding. Follow your doctor's instructions about using this medicine if you are pregnant or you become pregnant. Controlling diabetes is very important during pregnancy. How should I use insulin glargine? Follow all directions on your prescription label and read all medication guides or instruction sheets. Use the medicine exactly as directed. Insulin glargine is injected under the skin, usually once per day at the same time of day. When treating type 1 diabetes, use your short-acting insulin before meals as directed by your doctor. Insulin glargine must not be given with an insulin pump, or mixed with other insulins. Do not inject insulin glargine into a vein or a muscle. Ask your doctor or pharmacist if you don't understand how to use an injection. Prepare an injection only when you are ready to give it. Call your pharmacist if the medicine looks cloudy, has changed colors, or has particles in it. Your healthcare provider will show you where to inject insulin glargine.  Do not inject into the same place two times in a row. Avoid injecting into skin that is damaged, tender, bruised, pitted, thickened, scaly, or has a scar or hard lump. Toujeo contains 300 units of insulin glargine per milliliter (mL), which is 3 times stronger than brands that contain 100 units per mL. Your dose needs may change if you switch to a different brand, strength, or form of this medicine. Avoid medication errors by using only the medicine your doctor prescribes. If you use an injection pen, use only the injection pen that comes with insulin glargine. Attach a new needle before each use. Do not transfer the insulin from the pen into a syringe. Never share an injection pen, even if you changed the needle. Sharing these devices can pass infections from person to person. Blood sugar can be affected by stress, illness, surgery, exercise, alcohol use, or skipping meals. Low blood sugar (hypoglycemia) can make you feel very hungry, dizzy, irritable, or shaky. To quickly treat hypoglycemia, eat or drink hard candy, crackers, raisins, fruit juice, or non-diet soda. Your doctor may prescribe glucagon injection in case of severe hypoglycemia. Tell your doctor if you have frequent symptoms of high blood sugar (hyperglycemia) such as increased thirst or urination. Ask your doctor before changing your medication dosage. Keep this medicine in its original container protected from heat and light. Do not freeze insulin or store it near the cooling element in a refrigerator. Throw away any insulin that has been frozen. Storing unopened (not in use) insulin glargine:   Refrigerate and use until expiration date; or  (Basaglar, Lantus, or Semglee) Store at room temperature (below 86 degrees Fahrenheit) and use within 28 days. Storing opened (in use) insulin glargine:   Store the vial in a refrigerator or at room temperature and use within 28 days.   Store the injection pen at room temperature (do not refrigerate) and use within 28 days. Store Toujeo  at room temperature below 86 F (do not refrigerate) and use within 56 days. Do not store an injection pen with the needle attached. Do not reuse a needle or syringe. Place them in a puncture-proof \"sharps\" container and dispose of it following state or local laws. Keep out of the reach of children and pets. Wear a medical alert tag or carry an ID card to let others know you have diabetes. What happens if I miss a dose? Call your doctor for instructions if you miss a dose. Do not use more than one dose in a 24-hour period unless your doctor tells you to. Get your prescription refilled before you run out of medicine completely. What happens if I overdose? Seek emergency medical attention or call the Poison Help line at 1-711.858.2463. Insulin overdose can cause severe hypoglycemia. Symptoms include drowsiness, confusion, blurred vision, numbness or tingling in your mouth, trouble speaking, muscle weakness, clumsy or jerky movements, seizure (convulsions), or loss of consciousness. What should I avoid while using insulin glargine? Avoid driving or hazardous activity until you know how this medicine will affect you. Your reactions could be impaired. Avoid medication errors by always checking the medicine label before injecting your insulin. Avoid drinking alcohol or using medicines that contain alcohol. It may interfere with your diabetes treatment. What are the possible side effects of insulin glargine? Get emergency medical help if you have signs of insulin allergy: redness or swelling where an injection was given, itchy skin rash over the entire body, trouble breathing, fast heartbeats, feeling like you might pass out, or swelling in your tongue or throat.   Call your doctor at once if you have:  rapid weight gain, swelling in your feet or ankles;  shortness of breath; or  low blood potassium --leg cramps, constipation, irregular heartbeats, fluttering in your chest, increased thirst or urination, numbness or tingling, muscle weakness or limp feeling. Common side effects may include:  low blood sugar;  swelling, weight gain;  allergic reaction, itching, rash; or  thickening or hollowing of the skin where you injected the medicine. This is not a complete list of side effects and others may occur. Call your doctor for medical advice about side effects. You may report side effects to FDA at 1-222-IWE-9872. What other drugs will affect insulin glargine? Many drugs can affect your blood sugar and may also affect insulin glargine. This includes prescription and over-the-counter medicines, vitamins, and herbal products. Tell your doctor about all other medicines you use. Not all possible interactions are listed here. Where can I get more information? Your pharmacist can provide more information about insulin glargine. Remember, keep this and all other medicines out of the reach of children, never share your medicines with others, and use this medication only for the indication prescribed. Every effort has been made to ensure that the information provided by [address] is accurate, up-to-date, and complete, but no guarantee is made to that effect. Drug information contained herein may be time sensitive. [facility] information has been compiled for use by healthcare practitioners and consumers in the [facility] and therefore [facility] does not warrant that uses outside of the [facility] are appropriate, unless specifically indicated otherwise. [facility]'s drug information does not endorse drugs, diagnose patients or recommend therapy. [facility] drug information is an informational resource designed to assist licensed healthcare practitioners in caring for their patients and/or to serve consumers viewing this service as a supplement to, and not a substitute for, the expertise, skill, knowledge and judgment of healthcare practitioners.  The absence of a warning for a given drug or drug combination in no way should be construed to indicate that the drug or drug combination is safe, effective or appropriate for any given patient. [facility] does not assume any responsibility for any aspect of healthcare administered with the aid of information [facility] provides. The information contained herein is not intended to cover all possible uses, directions, precautions, warnings, drug interactions, allergic reactions, or adverse effects. If you have questions about the drugs you are taking, check with your doctor, nurse or pharmacist.  Copyright 6778-0566 [address]. Version: 15.02. Revision date: 2/18/2022. Care instructions adapted under license by [facility]. If you have questions about a medical condition or this instruction, always ask your healthcare professional. [place] [address] any warranty or liability for your use of this information. insulin lispro (short acting insulin)  Pronunciation:  IN doe nicholas LORRAINE pro  Brand:  Admelog, HumaLOG, Lyumjev  What is the most important information I should know about insulin lispro? Never share an injection pen, cartridge, or syringe with another person, even if the needle has been changed. What is insulin lispro? Insulin is a hormone that works by lowering levels of glucose (sugar) in the blood. Insulin lispro is a fast-acting insulin that starts to work about 15 minutes after injection, peaks in about 1 hour, and keeps working for 2 to 4 hours. Insulin lispro is used to improve blood sugar control in adults and children with diabetes mellitus. Admelog and HumaLOG are used to treat type 2 diabetes in adults, or type 1 diabetes in adults and children who are at least 1years old. Lyumjev is not approved for use by anyone younger than 25years old. Insulin lispro may also be used for purposes not listed in this medication guide.   What should I discuss with my healthcare provider before using insulin lispro? You should not use insulin lispro if you are allergic to it, or if you are having an episode of hypoglycemia (low blood sugar). Insulin lispro should not be given to a child younger than 1years old. Insulin lispro should not be used to treat type 2 diabetes in a child of any age. Tell your doctor if you have ever had:  liver or kidney disease;  heart problems; or  low levels of potassium in your blood (hypokalemia). Tell your doctor if you also take medicine that contains pioglitazone or rosiglitazone. Taking certain oral diabetes medicines while you are using insulin may increase your risk of serious heart problems. Tell your doctor if you are pregnant or breastfeeding. Follow your doctor's instructions about using insulin if you are pregnant or you become pregnant. Controlling diabetes is very important during pregnancy, and having high blood sugar may cause complications in both the mother and the baby. How should I use insulin lispro? Follow all directions on your prescription label and read all medication guides or instruction sheets. Use the medicine exactly as directed. Insulin lispro is injected under the skin with a syringe and needle, an injection pen, or with an infusion pump. A healthcare provider will teach you how to properly use this medicine by yourself. Read and carefully follow any Instructions for Use provided with your medicine. Ask your doctor or pharmacist if you do not understand these instructions. Prepare your injection only when you are ready to give it. Do not use if the medicine looks cloudy, has changed colors or has particles in it. Call your pharmacist for new medicine. Admelog or HumaLOG are given within 15 minutes before a meal, or right after eating. Lyumjev is given at the start of a meal or within 20 minutes after eating. Your healthcare provider will show you where on your body to inject insulin lispro. Use a different place each time you give an injection. Do not inject into the same place two times in a row. Do not inject into skin that is damaged, tender, bruised, pitted, thickened, scaly, or has a scar or hard lump. Concentrated insulin lispro (200 units) must not be given with an insulin pump, or mixed with other insulins. Do not transfer insulin lispro from an injection pen to a syringe or a severe overdose could occur. Never share an injection pen, cartridge, or syringe with another person, even if the needle has been changed. Sharing these devices can allow infections or disease to pass from one person to another. You may have low blood sugar (hypoglycemia) and feel very hungry, dizzy, irritable, confused, anxious, or shaky. To quickly treat hypoglycemia, eat or drink a fast-acting source of sugar (fruit juice, hard candy, crackers, raisins, or non-diet soda). Your doctor may prescribe a glucagon injection kit in case you have severe hypoglycemia. Be sure your family or close friends know how to give you this injection in an emergency. Also watch for signs of high blood sugar (hyperglycemia) such as increased thirst or urination. Blood sugar levels can be affected by stress, illness, surgery, exercise, alcohol use, or skipping meals. Ask your doctor before changing your dose or medication schedule. Keep this medicine in its original container protected from heat and light. Do not freeze insulin or store it near the cooling element in a refrigerator. Throw away any insulin that has been frozen. Storing unopened (not in use) insulin lispro:   Refrigerate and use until expiration date; or  Store at room temperature and use within 28 days. Storing opened (in use) insulin lispro:   Store the vial in a refrigerator or at room temperature and use within 28 days. Store the cartridge or injection pen (without a needle attached) at room temperature and use within 28 days. Use a needle and syringe only once and place them in a puncture-proof \"sharps\" container. Follow state or local laws about how to dispose of this container. Keep it out of the reach of children and pets. What happens if I miss a dose? Since insulin lispro is used with meals, you may not be on a timed dosing schedule. Whenever you use insulin lispro, follow the directions for your specific brand about whether to use the medicine before of after you eat. Do not use two doses at one time. What happens if I overdose? Seek emergency medical attention or call the Poison Help line at 1-431.628.9842. Insulin overdose can cause life-threatening hypoglycemia. Symptoms include drowsiness, confusion, blurred vision, numbness or tingling in your mouth, trouble speaking, muscle weakness, clumsy or jerky movements, seizure (convulsions), or loss of consciousness. What should I avoid while using insulin lispro? Insulin can cause low blood sugar. Avoid driving or operating machinery until you know how this medicine will affect you. Avoid medication errors by always checking the medicine label before injecting your insulin. Avoid drinking alcohol. What are the possible side effects of insulin lispro? Get emergency medical help if you have signs of insulin allergy: redness or swelling where an injection was given, itchy skin rash over the entire body, trouble breathing, fast heartbeats, feeling like you might pass out, or swelling in your tongue or throat. Call your doctor at once if you have:  weight gain, swelling in your hands or feet, feeling short of breath;  low blood sugar --headache, hunger, sweating, irritability, dizziness, fast heart rate, and feeling anxious or shaky; or  low potassium --leg cramps, constipation, irregular heartbeats, fluttering in your chest, increased thirst or urination, numbness or tingling, muscle weakness or limp feeling.   Common side effects may include:  low blood sugar;  weight gain;  swelling in your hands or feet;  itching; or  thickening or hollowing of the skin where you injected the medicine. This is not a complete list of side effects and others may occur. Call your doctor for medical advice about side effects. You may report side effects to FDA at 5-831-MJC-5641. What other drugs will affect insulin lispro? Many other medicines can affect your blood sugar, and some medicines can increase or decrease the effects of insulin. Some drugs can also cause you to have fewer symptoms of hypoglycemia, making it harder to tell when your blood sugar is low. Tell each of your health care providers about all medicines you use now and any medicine you start or stop using. This includes prescription and over-the-counter medicines, vitamins, and herbal products. Where can I get more information? Your pharmacist can provide more information about insulin lispro. Remember, keep this and all other medicines out of the reach of children, never share your medicines with others, and use this medication only for the indication prescribed. Every effort has been made to ensure that the information provided by [address] is accurate, up-to-date, and complete, but no guarantee is made to that effect. Drug information contained herein may be time sensitive. [facility] information has been compiled for use by healthcare practitioners and consumers in the [place] and therefore [employer] does not warrant that uses outside of the [place] are appropriate, unless specifically indicated otherwise. [facility]'s drug information does not endorse drugs, diagnose patients or recommend therapy. [facility] drug information is an informational resource designed to assist licensed healthcare practitioners in caring for their patients and/or to serve consumers viewing this service as a supplement to, and not a substitute for, the expertise, skill, knowledge and judgment of healthcare practitioners.  The absence of a warning for a given drug or drug combination in no way should be construed to indicate that the drug or drug combination is safe, effective or appropriate for any given patient. [facility] does not assume any responsibility for any aspect of healthcare administered with the aid of information [facility] provides. The information contained herein is not intended to cover all possible uses, directions, precautions, warnings, drug interactions, allergic reactions, or adverse effects. If you have questions about the drugs you are taking, check with your doctor, nurse or pharmacist.  Copyright 9708-9844 [address]. Version: 9.01. Revision date: 7/8/2020. Care instructions adapted under license by [facility] ([facility]). If you have questions about a medical condition or this instruction, always ask your healthcare professional. [place] [address] any warranty or liability for your use of this information.

## 2022-10-01 NOTE — PROGRESS NOTES
Pt requesting \"sleep aid\", pt reports he doesn't routinely take anything at home for sleep. Hospitalist notified, waiting for response.

## 2022-10-01 NOTE — PROGRESS NOTES
Unable to reach [facility] r/t generalized fatigue.  Pt educated on lexiscan protocol

## 2022-10-01 NOTE — PROGRESS NOTES
Data- discharge order received, pt verbalized agreement to discharge, disposition to previous residence, no needs for HHC/DME. Action- discharge instructions prepared and given to pt, pt verbalized understanding. Medication information packet given r/t NEW and/or CHANGED prescriptions emphasizing name/purpose/side effects, pt verbalized understanding. Discharge instruction summary: Diet- low sodium, carb control, Activity- as tolerated, Primary Care Physician as follows: Vero Bryan [phone] f/u appointment 1 week with Cardiology, prescription medications sent to pharmacy of pt's choice. CHF Education reviewed. Pt/ Family has had a total of 60 minutes CHF education this admission encounter. Letter given for return to work. 1. WEIGHT: Admit Weight: 235 lb (106.6 kg) (09/27/22 0956)        Today  Weight: 235 lb 9.6 oz (106.9 kg) (09/30/22 0813)       2. O2 SAT.: SpO2: 98 % (10/01/22 1245)    Response- Pt belongings gathered, IV removed. Disposition is home (no HHC/DME needs), transported with papers and belongings, taken to lobby via w/c w/ RN, no complications.

## 2022-10-01 NOTE — PROGRESS NOTES
Office : [phone]     Fax :[phone]       Nephrology progress  Note      Patient's Name: Eren Bains  9:11 AM  10/1/2022    Reason for Consult:  HTN urgency       Requesting Physician:  Irish Bueno MD      Chief Complaint:    Chief Complaint   Patient presents with    Chest Pain     Chest pain, headache, vomiting onset Sunday evening           History of Present Ilness:    Eren Bains is a 52 y.o. male with prior history of DM2, HTN - uncontrolled , sleep apnea who was admitted with c/o chest pain, headache . He had high BP of  178/133. Has been HTN for around 10 years   Has sedentary life style     Eats high sodium foods     Has long standing uncontrolled DM 2       Interval hx     Feels better   BP well controlled         I/O last 3 completed shifts:  In: -   Out: 1375 [Urine:1375]    Past Medical History:   Diagnosis Date    CHF (congestive heart failure) ([facility] [address]     Hyperlipidemia     Hypertension     Sleep apnea        Past Surgical History:   Procedure Laterality Date    HERNIA REPAIR         History reviewed. No pertinent family history. reports that he has never smoked. He has never used smokeless tobacco. He reports current alcohol use. He reports that he does not currently use drugs.         Allergies:  Azithromycin and Atorvastatin    Current Medications:    technetium tetrofosmin (Tc-MYOVIEW) injection 30 millicurie, ONCE PRN  insulin glargine (LANTUS) injection vial 25 Units, Nightly  amLODIPine (NORVASC) tablet 5 mg, Nightly  melatonin tablet 3 mg, Nightly PRN  losartan (COZAAR) tablet 100 mg, Daily  insulin lispro (HUMALOG) injection vial 10 Units, TID WC  guaiFENesin-codeine (GUAIFENESIN AC) 100-10 MG/5ML liquid 5 mL, Q4H PRN  dextrose bolus 10% 125 mL, PRN   Or  dextrose bolus 10% 250 mL, PRN  glucagon (rDNA) injection 1 mg, PRN  dextrose 10 % infusion, Continuous PRN  perflutren lipid microspheres (DEFINITY) injection 1.65 mg, ONCE PRN  insulin lispro (HUMALOG) injection vial 0-8 Units, TID WC  insulin lispro (HUMALOG) injection vial 0-4 Units, Nightly  metoprolol succinate (TOPROL XL) extended release tablet 200 mg, Daily  sodium chloride flush 0.9 % injection 5-40 mL, 2 times per day  sodium chloride flush 0.9 % injection 5-40 mL, PRN  0.9 % sodium chloride infusion, PRN  ondansetron (ZOFRAN-ODT) disintegrating tablet 4 mg, Q8H PRN   Or  ondansetron (ZOFRAN) injection 4 mg, Q6H PRN  polyethylene glycol (GLYCOLAX) packet 17 g, Daily PRN  acetaminophen (TYLENOL) tablet 650 mg, Q6H PRN   Or  acetaminophen (TYLENOL) suppository 650 mg, Q6H PRN      Review of Systems:   14 point ROS obtained but were negative except mentioned in HPI      Physical exam:     Vitals:  /75   Pulse 79   Temp 97.8 °F (36.6 °C) (Oral)   Resp 16   Ht 5' 8\" (1.727 m)   Wt 235 lb 9.6 oz (106.9 kg)   SpO2 94%   BMI 35.82 kg/m²   Constitutional:  OAA X3 NAD  Skin: no rash, turgor wnl  Heent:  eomi, mmm  Neck: no bruits or jvd noted  Cardiovascular:  S1, S2 without m/r/g  Respiratory: CTA B without w/r/r  Abdomen:  +bs, soft, nt, nd  Ext: no  lower extremity edema  Psychiatric: mood and affect appropriate  Musculoskeletal:  Rom, muscular strength intact    Labs:  CBC:   Recent Labs     09/29/22  0639 09/30/22  0811 10/01/22  0600   WBC 6.6 5.0 5.5   HGB 14.1 14.4 14.0    228 265     BMP:    Recent Labs     09/29/22  0639 09/30/22  0811 10/01/22  0559    139 143   K 3.9 4.2 3.4*    105 107   CO2 23 22 23   BUN 24* 22* 20   CREATININE 0.8* 0.8* 1.2   GLUCOSE 308* 275* 135*     Ca/Mg/Phos:   Recent Labs     09/29/22  0639 09/30/22  0811 10/01/22  0559   CALCIUM 8.6 9.0 8.5   MG  --   --  1.90     Hepatic:   No results for input(s): AST, ALT, ALB, BILITOT, ALKPHOS in the last 72 hours. Troponin:   No results for input(s): TROPONINI in the last 72 hours. IMAGING:  XR CHEST PORTABLE   Final Result   Patchy bilateral airspace infiltrates most prominent in the right upper lobe   compatible with multifocal pneumonia. VL Renal Arterial Duplex Complete         CT HEAD WO CONTRAST   Final Result   No acute intracranial abnormality. CT CHEST PULMONARY EMBOLISM W CONTRAST   Final Result   No evidence of pulmonary embolism or aortic dissection. Patchy ground-glass infiltrates within both lungs, greatest within the right   upper lobe, most compatible with multifocal pneumonia. Consider both typical   and atypical etiologies, including viral pneumonia. RECOMMENDATIONS:   Unavailable         XR CHEST (2 VW)   Final Result   No acute cardiopulmonary findings. NM MYOCARDIAL SPECT REST EXERCISE OR RX    (Results Pending)          Abdominal:Renal, VL RENAL ARTERIAL DUPLEX COMPLETE. Tech Comments   Right   There is no evidence to suggest renal artery stenosis. The right renal vein is patent. The parenchymal resistive index is within normal limits. The right kidney measured 11.48 x 6.71 cm in diameter. The right kidney measures 5.28 cm in the transverse plane. Left   There is no evidence to suggest renal artery stenosis. The left renal vein is patent. The parenchymal resistive index is within normal limits. The left kidney measured 12.76 x 6.45 cm in diameter. Assessment/Plan :      1. HTN urgency   Chest pain   Continue losartan , metoprolol. Decrease amlodipine to 5 mg po at bedtime     losartan to 100 mg po daily     R/o secondary HTN   Check renin / aldosterone levels. On low dose lasix     Hypertension education given     ? Lose weight (if you are overweight)  ? Choose a diet low in fat and rich in fruits, vegetables, and low-fat dairy products  ? Reduce the amount of salt you eat, avoid sodas.   ?Do something active for at least 30 minutes a day on most days of the week  ? Cut down on alcohol (if you drink more than 2 alcoholic drinks per day), if you smoke then try to quit. 3. Anemia  Hb stable. 4. Acid- base disorder. monitor             F/u in office             D/w primary team      Thank you for allowing us to participate in care of [place]         Electronically signed by: Emely Espinoza MD, 10/1/2022, 9:11 AM      [facility]  Office : [phone]  Fax :[phone]

## 2022-10-03 ENCOUNTER — TELEPHONE (OUTPATIENT)
Dept: OTHER | Age: 47
End: 2022-10-03

## 2022-10-03 LAB — ALDOSTERONE: 18.6 NG/DL

## 2022-10-03 NOTE — TELEPHONE ENCOUNTER
[facility]  1975    ASSESSMENT:   Baseline weight: 235 lbs  Current weight: not weighing daily  Patient weighing daily: No  What are your symptoms of heart failure: chest pain, dyspnea, edema  Are you having any symptoms:  No  Patient knows who to call with symptoms: Yes  Diet history:      Patient states sodium limitation is : 3000 mg      Patient states fluid limitation is 64 oz  Patient following diet as instructed: Yes  Medication history: taking medications as instructed Yes; medication side effects noted No  Patient is being active at home: Yes  Patient knows date and time of follow up appointment: Yes   Patient has transportation to appointments: Yes    RECOMMENDATIONS:   Medication: taking as prescribed  Diet: no added salt diet  MD/ Clinic appointment: 10/5 with Anna Snell NP  Other: Doing well. Reviewed HF education  with patient. Encouraged patient to call with any questions or concerns.

## 2022-10-04 LAB — RENIN ACTIVITY: <0.1 NG/ML/HR

## 2022-10-04 NOTE — PROGRESS NOTES
[facility]   Congestive Heart Failure    Primary Care Doctor:  Zahra Mcclure MD  Primary Cardiologist: Alexsandra Davila       Chief Complaint:  SOB    History of Present Illness:  Han Salinas is a 52 y.o. male with  PMH HTN, HLD, DAVID, HFpEF, new dx HFrEF  who presents today for hospital f/u. Han Salinas was admitted 9/27/22 and discharged 10/1/22. Hospital course:  dyspnea secondary viral pneumonia and acute systolic heart failure with EF of 35% diuresed with IV Lasix and this improved patient underwent stress test which showed no active ischemia. Patient of hypertensive emergency and with medication were titrated up and this resolved patient did have new onset diabetes with A1c of 14.5 patient was started on Lantus and lispro we will discharge advised to monitor blood sugars and follow-up with PCP to further adjust insulin as needed    Since hospitalization she reports dyspnea with activity and fatigue and denies chest pain, palpitations, orthopnea, PND or edema. hospital weight on d/c was 235lb and discharge home weight was 233lb. Daily wts since that time have remained stable. Today's home wt: 233    F/U Phone Call date: 10/3/22    Baseline Weight: 235  Wt Readings from Last 3 Encounters:   09/30/22 235 lb 9.6 oz (106.9 kg)      Admit BNP: 1868   Discharge BNP: nd      EF: 30-35%  Cardiac Imaging:  Echo 9/28/2022:  Summary   Normal left ventricle size. There is moderate concentric left ventricular hypertrophy. Overall left ventricular systolic function appears moderately reduced. Ejection fraction is visually estimated to be 30-35%. There is hypokinesis of the anterior, inferior, anterolateral, and inferolateral walls. Akinetic lateral wall. Grade II diastolic dysfunction with elevated LV filling pressures. Moderate mitral regurgitation with multiple jets. The left atrium is dilated. The right ventricle is enlarged.    Systolic pulmonary artery pressure (SPAP) estimated at 70 mmHg (RA pressure   15 mmHg), consistent with moderate pulmonary hypertension. The right atrium is dilated. IVC size is dilated (>2.1 cm) and collapses < 50% with respiration   consistent with elevated RA pressure (15 mmHg). Echo 2016 at :   Study Conclusions   - Left ventricle: The cavity size was normal. Wall thickness was     increased in a pattern of mild LVH. Systolic function was normal.     The estimated ejection fraction was in the range of 55% to 60%. Wall motion was normal; there were no regional wall motion     abnormalities. Doppler parameters are consistent with abnormal     left ventricular relaxation (grade 1 diastolic dysfunction). - Ventricular septum: The outflow septum had a sigmoid appearance. - Right ventricle: Systolic function was normal by objective     interpretation. TAPSE: 2.3cm. Tricuspid annular systolic velocity:     14RP/J. - Atrial septum: The septum bowed from left to right, consistent     with increased left atrial pressure. ST 10/1/22:        Summary    No evidence of myocardium at risk for significant reversible ischemia. There is moderate global LV systolic dysfunction with ejection fraction of    31 %. Overall findings represent a low risk scan. Device: none    ICD/Lifevest Counseling: No          Activity: below baseline due to [place], exercising  Can you walk 1-2 blocks or do a moderate amount of house/yard work? Yes  Cardiac Rehab Referral: No , pt declines    NYHA Class: II       Sodium Restrictions: 3g  Fluid Restrictions: 48-64 oz/day  Sodium and fluid restriction compliance: fair    Pt Education: The patient has received education on the following topics: dietary sodium restriction, heart failure medications, the importance of physical activity, symptom management and weight monitoring        Past Medical History:   has a past medical history of CHF (congestive heart failure) ([address] Hyperlipidemia, Hypertension, and Sleep apnea.  Surgical History:   has a past surgical history that includes hernia repair. Social History:   reports that he has never smoked. He has never used smokeless tobacco. He reports current alcohol use. He reports that he does not currently use drugs. Family History:   No family history on file. Home Medications:  Prior to Admission medications    Medication Sig Start Date End Date Taking? Authorizing Provider   glucose monitoring (FREESTYLE FREEDOM) kit 1 kit by Does not apply route daily 10/1/22   Karolina Ribera MD   Insulin Glargine-yfgn (SEMGLEE, YFGN,) 100 UNIT/ML SOPN Inject 25 Units into the skin at bedtime 10/1/22   Karolina Ribera MD   insulin lispro, 1 Unit Dial, (HUMALOG KWIKPEN) 100 UNIT/ML SOPN Inject 10 Units into the skin 3 times daily (before meals) 10/1/22   Karolina Ribera MD   Insulin Pen Needle ([employer] PEN NEEDLES) 31G X 6 MM MISC 1 each by Does not apply route daily 10/1/22   Karolina Ribera MD   losartan (COZAAR) 100 MG tablet Take 1 tablet by mouth daily 10/1/22   Karolina Ribera MD   amLODIPine (NORVASC) 5 MG tablet Take 1 tablet by mouth nightly 10/1/22   Karolina Ribera MD   metoprolol succinate (TOPROL XL) 200 MG extended release tablet Take 1 tablet by mouth daily 10/1/22   Karolina Ribera MD   furosemide (LASIX) 40 MG tablet Take 1 tablet by mouth daily 10/1/22   Karolina Ribera MD   Empagliflozin-metFORMIN HCl ER (SYNJARDY XR) 12.5-1000 MG TB24 Take 2 tablets by mouth daily 1/14/22   Historical Provider, MD        Allergies:  Azithromycin and Atorvastatin     ROS:   Review of Systems   Constitutional:  Positive for fatigue. Respiratory:  Positive for shortness of breath. Cardiovascular: Negative. Gastrointestinal: Negative. Genitourinary: Negative. Musculoskeletal: Negative. Skin: Negative. Neurological: Negative. Hematological: Negative. Psychiatric/Behavioral: Negative.        Physical Examination:    Vitals:    10/05/22 0855   BP: (!) 126/90   Site: Right Upper Arm   Position: Sitting   Cuff Size: Large Adult   Pulse: 93   SpO2: 100%   Weight: 234 lb (106.1 kg)   Height: 5' 8\" (1.727 m)           Physical Exam  Vitals reviewed. Constitutional:       Appearance: Normal appearance. He is normal weight. HENT:      Head: Normocephalic and atraumatic. Eyes:      Extraocular Movements: Extraocular movements intact. Pupils: Pupils are equal, round, and reactive to light. Cardiovascular:      Rate and Rhythm: Normal rate and regular rhythm. Pulses: Normal pulses. Heart sounds: Normal heart sounds. Pulmonary:      Effort: Pulmonary effort is normal.      Breath sounds: Normal breath sounds. Abdominal:      General: Abdomen is flat. Palpations: Abdomen is soft. Musculoskeletal:         General: Normal range of motion. Cervical back: Normal range of motion and neck supple. Skin:     General: Skin is warm and dry. Neurological:      General: No focal deficit present. Mental Status: He is alert and oriented to person, place, and time. Mental status is at baseline. Psychiatric:         Mood and Affect: Mood normal.         Behavior: Behavior normal.         Thought Content:  Thought content normal.         Judgment: Judgment normal.       Lab Data:    CBC:   Lab Results   Component Value Date/Time    WBC 5.5 10/01/2022 06:00 AM    WBC 5.0 09/30/2022 08:11 AM    WBC 6.6 09/29/2022 06:39 AM    RBC 4.91 10/01/2022 06:00 AM    RBC 5.18 09/30/2022 08:11 AM    RBC 5.00 09/29/2022 06:39 AM    HGB 14.0 10/01/2022 06:00 AM    HGB 14.4 09/30/2022 08:11 AM    HGB 14.1 09/29/2022 06:39 AM    HCT 42.5 10/01/2022 06:00 AM    HCT 45.4 09/30/2022 08:11 AM    HCT 43.4 09/29/2022 06:39 AM    MCV 86.4 10/01/2022 06:00 AM    MCV 87.5 09/30/2022 08:11 AM    MCV 86.9 09/29/2022 06:39 AM    RDW 14.1 10/01/2022 06:00 AM    RDW 14.4 09/30/2022 08:11 AM    RDW 14.1 09/29/2022 06:39 AM     10/01/2022 06:00 AM     09/30/2022 08:11 AM     09/29/2022 06:39 AM     BMP:  Lab Results   Component Value Date/Time     10/01/2022 05:59 AM     09/30/2022 08:11 AM     09/29/2022 06:39 AM    K 3.4 10/01/2022 05:59 AM    K 4.2 09/30/2022 08:11 AM    K 3.9 09/29/2022 06:39 AM     10/01/2022 05:59 AM     09/30/2022 08:11 AM     09/29/2022 06:39 AM    CO2 23 10/01/2022 05:59 AM    CO2 22 09/30/2022 08:11 AM    CO2 23 09/29/2022 06:39 AM    BUN 20 10/01/2022 05:59 AM    BUN 22 09/30/2022 08:11 AM    BUN 24 09/29/2022 06:39 AM    CREATININE 1.2 10/01/2022 05:59 AM    CREATININE 0.8 09/30/2022 08:11 AM    CREATININE 0.8 09/29/2022 06:39 AM     BNP:   Lab Results   Component Value Date/Time    PROBNP 1,868 09/27/2022 10:39 AM     Iron Studies:  No results found for: FERRITIN  No results found for: IRON, TIBC, FERRITIN          Assessment/Plan:    1. Acute systolic congestive heart failure ([facility] [address] - compensated, continue lasix, will add alfredo after labs if ok   2. Dilated cardiomyopathy (HCC) - change losartan to entresto, continue BB, SGLT2i, add alfredo after labs, echo in 90 days   3. Primary hypertension - elevated, change to entresto, may be able to stop norvasc   4. SOB (shortness of breath) - start entresto   5. Hospital discharge follow-up - as above       Time   30-39 minutes spent preparing to see patient including reviewing patient history/prior tests/prior consults, performing a medical exam, counseling and educating patient/family/caregiver, ordering medications/tests/procedures, referring and communicating with PCPs and other pertinent consultants, documenting information in the EMR, independently interpreting results and communicating to family and coordination of patient care.     Instructions:   Medications:stop losartan and start entresto one pill twice a day, continue other meds  Labs:2 meds   Referrals: echo in 3months  Lifestyle Recommendations: Weigh yourself every day in the morning after urination, call [facility] if wt increases 2-3lb in one day or 5lb in one week, Limit sodium to 3000mg/day and fluids to 2L or 64oz/day. Follow up: 4 weeks with Jose Ureña CHF Resource Line: [phone]    Heart Failure Symptoms  When the heart is weak or stiff, it cannot pump enough blood to the body tissues. Often, fluid can build up in different areas of your body, including your lungs. Some symptoms of heart failure you might be able to see, while others you may feel. It is important you pay attention to your body. Monitor for all symptoms. Call the doctor when you notice something is wrong. By calling the doctor early, you may feel better sooner and stay well at home. If your symptoms are severe, the doctor might see you in the office or tell you to go to the hospital.   Trouble Breathing  Feeling short of breath when doing everyday activities or even at rest  Waking up feeling like you need to sit up to breathe  Trouble breathing when lying flat or feeling more comfortable sitting upright to sleep  Cough or Wheezing  Dry, hacking cough that is worse when you lie down  Swelling  Swelling in your feet, ankles or legs  Jewelry, clothing or shoes fitting tighter  Swelling in abdomen  Weight Gain  Rapid weight gain of 3 pounds or more in 1-7 days  Work with your doctor to find the ideal weight range for you to compare weight gain or loss    Tired Feeling  Feeling tired doing normal activities, like walking, bathing and shopping  Nausea, Bloating or Lack of Appetite  Feeling full or sick to your stomach after eating small meals or more so than usual  Abdominal bloating  Confusion or Impaired Thinking  Memory loss, a foggy feeling or confusion  This might be better observed by friends or family      Heart Failure Websites:   www.heart. org  Www.cardiosmart. org    Websites with Low Sodium Recipes: www.mayoclinic.org/healthy-lifestyle/recipes/low-sodium-recipes  www.[employer]/recipes    Smartphone lyssa's that can help you keep track of symptoms, weights, and medications:  HF Storylines  HF Path  My HF  CareZone  Point of Care Heart Failure  WOWME  H2O Overload    Nutrition Lyssa to track calories, carbohydrates and sodium content of food:   CalorieKing  MyFitnessPal    Low Sodium Meal Delivery  Top  Meals - offers senior discount  Meal Pro  Magic Kitchen  Mom's Meals - some insurance pays for  [place] for 50  and over, AARP 10% discount     Balance for under 50  Metabolic Meals          Medicines used for heart failure  Spironolactone: Aldosterone receptor antagonists. These are a type of diuretic. They make the kidneys get rid of extra fluid. Lisinopril: Angiotensin-converting enzyme (ACE) inhibitors help blood flow. They relax the blood vessels and lower your blood pressure. The heart can then pump more blood through your body without working harder. Losartan/Valsartan: Angiotensin II receptor blockers (ARBs) work like ACE inhibitors. Some people use them instead. Entresto: Angiotensin receptor neprilysin inhibitor (ARNI) medicine works like ARBs and ACE inhibitors. Some people take an ARNI medicine instead. Carvedilol/Metoprolol: Beta-blockers can slow the heart rate and decrease blood pressure. Digoxin relieves symptoms in some people with heart failure. Furosemide/Torsemide: Diuretics reduce swelling. They do this by helping the kidneys get rid of excess fluid. They are also called water pills. Hydralazine. This may be taken with isosorbide to widen blood vessels. It can lower blood pressure and reduce the workload on the heart. Ivabradine slows the heart rate. Farxiga/Jardiance: SGLT2 inhibitors help remove extra glucose through the urine.     I appreciate the opportunity of cooperating in the care of this individual.    Zulema Neal, APRN - CNP, 10/4/2022, 10:56 AM

## 2022-10-05 ENCOUNTER — OFFICE VISIT (OUTPATIENT)
Dept: CARDIOLOGY CLINIC | Age: 47
End: 2022-10-05

## 2022-10-05 VITALS
BODY MASS INDEX: 35.46 KG/M2 | WEIGHT: 234 LBS | DIASTOLIC BLOOD PRESSURE: 90 MMHG | HEART RATE: 93 BPM | SYSTOLIC BLOOD PRESSURE: 126 MMHG | HEIGHT: 68 IN | OXYGEN SATURATION: 100 %

## 2022-10-05 DIAGNOSIS — I50.21 ACUTE SYSTOLIC CONGESTIVE HEART FAILURE (HCC): Primary | ICD-10-CM

## 2022-10-05 DIAGNOSIS — R06.02 SOB (SHORTNESS OF BREATH): ICD-10-CM

## 2022-10-05 DIAGNOSIS — I42.0 DILATED CARDIOMYOPATHY (HCC): ICD-10-CM

## 2022-10-05 DIAGNOSIS — I10 PRIMARY HYPERTENSION: ICD-10-CM

## 2022-10-05 DIAGNOSIS — Z09 HOSPITAL DISCHARGE FOLLOW-UP: ICD-10-CM

## 2022-10-05 RX ORDER — EMPAGLIFLOZIN, METFORMIN HYDROCHLORIDE 12.5; 1 MG/1; MG/1
2 TABLET, EXTENDED RELEASE ORAL DAILY
Qty: 60 TABLET | Refills: 1 | Status: SHIPPED | OUTPATIENT
Start: 2022-10-05

## 2022-10-05 RX ORDER — SACUBITRIL AND VALSARTAN 49; 51 MG/1; MG/1
1 TABLET, FILM COATED ORAL 2 TIMES DAILY
Qty: 60 TABLET | Refills: 5 | Status: SHIPPED | OUTPATIENT
Start: 2022-10-05

## 2022-10-05 RX ORDER — TADALAFIL 10 MG/1
10 TABLET ORAL DAILY PRN
Qty: 10 TABLET | Refills: 5 | Status: SHIPPED | OUTPATIENT
Start: 2022-10-05

## 2022-10-05 ASSESSMENT — ENCOUNTER SYMPTOMS
SHORTNESS OF BREATH: 1
GASTROINTESTINAL NEGATIVE: 1

## 2022-10-05 NOTE — PATIENT INSTRUCTIONS
Instructions:   Medications:stop losartan and start entresto one pill twice a day, continue other meds  Labs:2 meds   Referrals: echo in 3months  Lifestyle Recommendations: Weigh yourself every day in the morning after urination, call [facility] if wt increases 2-3lb in one day or 5lb in one week, Limit sodium to 3000mg/day and fluids to 2L or 64oz/day. Follow up: 4 weeks with Caleb Avila CHF Resource Line: [phone]    Heart Failure Symptoms  When the heart is weak or stiff, it cannot pump enough blood to the body tissues. Often, fluid can build up in different areas of your body, including your lungs. Some symptoms of heart failure you might be able to see, while others you may feel. It is important you pay attention to your body. Monitor for all symptoms. Call the doctor when you notice something is wrong. By calling the doctor early, you may feel better sooner and stay well at home.  If your symptoms are severe, the doctor might see you in the office or tell you to go to the hospital.   Trouble Breathing  Feeling short of breath when doing everyday activities or even at rest  Waking up feeling like you need to sit up to breathe  Trouble breathing when lying flat or feeling more comfortable sitting upright to sleep  Cough or Wheezing  Dry, hacking cough that is worse when you lie down  Swelling  Swelling in your feet, ankles or legs  Jewelry, clothing or shoes fitting tighter  Swelling in abdomen  Weight Gain  Rapid weight gain of 3 pounds or more in 1-7 days  Work with your doctor to find the ideal weight range for you to compare weight gain or loss    Tired Feeling  Feeling tired doing normal activities, like walking, bathing and shopping  Nausea, Bloating or Lack of Appetite  Feeling full or sick to your stomach after eating small meals or more so than usual  Abdominal bloating  Confusion or Impaired Thinking  Memory loss, a foggy feeling or confusion  This might be better observed by friends or family      Heart Failure Websites:   www.heart. org  Www.cardiosmart. org    Websites with Low Sodium Recipes:   www.mayoclinic.org/healthy-lifestyle/recipes/low-sodium-recipes  www.[employer]/recipes    Smartphone lyssa's that can help you keep track of symptoms, weights, and medications:  HF Storylines  HF Path  My HF  CareZone  Point of Care Heart Failure  WOWME  H2O Overload    Nutrition Lyssa to track calories, carbohydrates and sodium content of food:   CalorieKing  MyFitnessPal    Low Sodium Meal Delivery  Top  Meals - offers senior discount  Meal Pro  Magic Kitchen  Mom's Meals - some insurance pays for  [place] for 50  and over, AARP 10% discount     Balance for under 50  Metabolic Meals          Medicines used for heart failure  Spironolactone: Aldosterone receptor antagonists. These are a type of diuretic. They make the kidneys get rid of extra fluid. Lisinopril: Angiotensin-converting enzyme (ACE) inhibitors help blood flow. They relax the blood vessels and lower your blood pressure. The heart can then pump more blood through your body without working harder. Losartan/Valsartan: Angiotensin II receptor blockers (ARBs) work like ACE inhibitors. Some people use them instead. Entresto: Angiotensin receptor neprilysin inhibitor (ARNI) medicine works like ARBs and ACE inhibitors. Some people take an ARNI medicine instead. Carvedilol/Metoprolol: Beta-blockers can slow the heart rate and decrease blood pressure. Digoxin relieves symptoms in some people with heart failure. Furosemide/Torsemide: Diuretics reduce swelling. They do this by helping the kidneys get rid of excess fluid. They are also called water pills. Hydralazine. This may be taken with isosorbide to widen blood vessels. It can lower blood pressure and reduce the workload on the heart. Ivabradine slows the heart rate. Farxiga/Jardiance: SGLT2 inhibitors help remove extra glucose through the urine.

## 2022-10-06 NOTE — CONSULTS
[facility]  [phone]      Reason for Consultation/Chief Complaint: \" Shortness of breath, cardiomyopathy. \"    History of Present Illness:  Jose Pascual is a 52 y.o. patient who presented to the hospital with complaints of shortness of breath. He describes onset of shortness of breath and coughing. Chest discomfort occurred with coughing. Past history of CHF about 9 years. Past Medical History:   has a past medical history of CHF (congestive heart failure) ([address] Hyperlipidemia, Hypertension, and Sleep apnea. Surgical History:   has a past surgical history that includes hernia repair. Social History:   reports that he has never smoked. He has never used smokeless tobacco. He reports current alcohol use. He reports that he does not currently use drugs. Family History:  family history is not on file. Home Medications:  Were reviewed and are listed in nursing record. and/or listed below  Prior to Admission medications    Medication Sig Start Date End Date Taking? Authorizing Provider   Empagliflozin-metFORMIN HCl ER (SYNJARDY XR) 12.5-1000 MG TB24 Take 2 tablets by mouth daily 1/14/22  Yes Historical Provider, MD   metoprolol succinate (TOPROL XL) 100 MG extended release tablet Take 200 mg by mouth daily   Yes Historical Provider, MD        Allergies:  Azithromycin and Atorvastatin     Review of Systems:   A complete review of systems has been reviewed and updated today and is negative except as noted in the history of present illness.       Physical Examination:    Vitals:    09/29/22 0745   BP: (!) 143/99   Pulse: 88   Resp: 16   Temp: 97.8 °F (36.6 °C)   SpO2: 93%    Weight: 234 lb 9.6 oz (106.4 kg)         General Appearance:  Alert, cooperative, no distress, appears stated age   Head:  Normocephalic, without obvious abnormality, atraumatic   Eyes:  EOMI, conjunctiva/corneas clear       Nose: Nares normal   Throat: Lips normal   Neck: Supple, symmetrical, trachea midline,  no carotid bruit or JVD       Lungs:   Clear to auscultation bilaterally, respirations unlabored   Chest Wall:  No tenderness or deformity   Heart:  Regular rate and rhythm, S1, S2 normal, no significant murmur, rub or gallop   Abdomen:   Soft, non-tender, bowel sounds active all four quadrants,  no masses, no organomegaly           Extremities: Extremities normal, atraumatic, no cyanosis or edema   Pulses: 2+ and symmetric   Skin: Skin color, texture, turgor normal, no rashes or lesions   Pysch: Normal mood and affect   Neurologic: Normal gross motor and sensory exam.         EKG:  I have reviewed EKG with the following interpretation:  Impression:    27-SEP-2022 09:52:01 [facility] ROUTINE RECORD  Sinus tachycardia  Possible Left atrial enlargement  Nonspecific ST and T wave abnormality    TELEMETRY (Personally reviewed by me): Sinus     Lab Data:  CBC:   Recent Labs     09/27/22  1039 09/28/22  0548 09/29/22  0639   WBC 6.6 5.7 6.6   HGB 14.0 13.5 14.1   HCT 42.9 41.7 43.4   MCV 86.6 85.7 86.9    230 215     BMP:   Recent Labs     09/27/22  1039 09/28/22  0548 09/29/22  0639    136 140   K 4.1 3.8 3.9    103 105   CO2 24 21 23   BUN 11 20 24*   CREATININE 0.8* 1.0 0.8*     LIVER PROFILE:   Recent Labs     09/27/22  1039   AST 16   ALT 29   BILITOT 1.1*   ALKPHOS 90     PT/INR: No results for input(s): PROTIME, INR in the last 72 hours. APTT: No results for input(s): APTT in the last 72 hours. BNP:  No results for input(s): BNP in the last 72 hours. Imaging/Procedures:   Echo 9/28/2022:  Summary   Normal left ventricle size. There is moderate concentric left ventricular hypertrophy. Overall left ventricular systolic function appears moderately reduced. Ejection fraction is visually estimated to be 30-35%. There is hypokinesis of the anterior, inferior, anterolateral, and inferolateral walls. Akinetic lateral wall.    Grade II diastolic dysfunction with elevated LV filling pressures. Moderate mitral regurgitation with multiple jets. The left atrium is dilated. The right ventricle is enlarged. Systolic pulmonary artery pressure (SPAP) estimated at 70 mmHg (RA pressure   15 mmHg), consistent with moderate pulmonary hypertension. The right atrium is dilated. IVC size is dilated (>2.1 cm) and collapses < 50% with respiration   consistent with elevated RA pressure (15 mmHg). Assessment/Plan:  Principal Problem:    Hypertensive urgency  Plan: Improving. Continue adjust antihypertensive medical regimen. Active Problems:    Cardiomyopathy [facility])  Plan: Likely hypertensive related. Given new onset of diabetes mellitus, along with suboptimally treated hypertension, would benefit from ischemic work-up. Discussed stress testing, coronary CTA and cardiac catheterization. Preferably cardiac catheterization would be most definitive however he would like to to proceed with coronary CTA. Type 2 diabetes mellitus, without long-term current use of insulin ([facility] [address]  Plan: Per primary service. Plan:   1. Coronary CTA. 2.  Agree with increasing Toprol-XL to 200 mg daily. 3.  May need additional IV Lopressor for the purposes of coronary CTA. 4.  Continue amlodipine and losartan. May benefit more from valsartan. Thank you for allowing us to participate in the care of [place]. Further evaluation will be based upon the patient's clinical course and testing results. All questions and concerns were addressed to the patient/family. Alternatives to my treatment were discussed. The note was completed using EMR. Every effort was made to ensure accuracy; however, inadvertent computerized transcription errors may be present.     Julio C Stoner M.D.

## 2023-03-17 RX ORDER — METOPROLOL SUCCINATE 200 MG/1
TABLET, EXTENDED RELEASE ORAL
Qty: 30 TABLET | Refills: 0 | Status: SHIPPED | OUTPATIENT
Start: 2023-03-17

## 2025-01-18 ENCOUNTER — APPOINTMENT (OUTPATIENT)
Dept: GENERAL RADIOLOGY | Age: 50
End: 2025-01-18
Payer: COMMERCIAL

## 2025-01-18 ENCOUNTER — HOSPITAL ENCOUNTER (EMERGENCY)
Age: 50
Discharge: HOME | End: 2025-01-18
Payer: COMMERCIAL

## 2025-01-18 VITALS
WEIGHT: 248.24 LBS | HEIGHT: 68 IN | RESPIRATION RATE: 18 BRPM | BODY MASS INDEX: 37.62 KG/M2 | OXYGEN SATURATION: 98 % | HEART RATE: 100 BPM | SYSTOLIC BLOOD PRESSURE: 179 MMHG | DIASTOLIC BLOOD PRESSURE: 129 MMHG | TEMPERATURE: 98.1 F

## 2025-01-18 DIAGNOSIS — U07.1 COVID-19 VIRUS INFECTION: Primary | ICD-10-CM

## 2025-01-18 DIAGNOSIS — R03.0 ELEVATED BLOOD PRESSURE READING: ICD-10-CM

## 2025-01-18 LAB
FLUAV + FLUBV AG NOSE IA.RAPID: NOT DETECTED
FLUAV + FLUBV AG NOSE IA.RAPID: NOT DETECTED
SARS-COV-2 RDRP RESP QL NAA+PROBE: DETECTED

## 2025-01-18 PROCEDURE — 6370000000 HC RX 637 (ALT 250 FOR IP): Performed by: PHYSICIAN ASSISTANT

## 2025-01-18 PROCEDURE — 87502 INFLUENZA DNA AMP PROBE: CPT

## 2025-01-18 PROCEDURE — 71046 X-RAY EXAM CHEST 2 VIEWS: CPT

## 2025-01-18 PROCEDURE — 87635 SARS-COV-2 COVID-19 AMP PRB: CPT

## 2025-01-18 PROCEDURE — 99284 EMERGENCY DEPT VISIT MOD MDM: CPT

## 2025-01-18 RX ORDER — BENZONATATE 100 MG/1
200 CAPSULE ORAL ONCE
Status: COMPLETED | OUTPATIENT
Start: 2025-01-18 | End: 2025-01-18

## 2025-01-18 RX ORDER — BENZONATATE 200 MG/1
200 CAPSULE ORAL 3 TIMES DAILY PRN
Qty: 20 CAPSULE | Refills: 0 | Status: SHIPPED | OUTPATIENT
Start: 2025-01-18

## 2025-01-18 RX ORDER — CETIRIZINE HYDROCHLORIDE 10 MG/1
10 TABLET ORAL DAILY
Qty: 30 TABLET | Refills: 0 | Status: SHIPPED | OUTPATIENT
Start: 2025-01-18

## 2025-01-18 RX ADMIN — BENZONATATE 200 MG: 100 CAPSULE ORAL at 11:06

## 2025-01-18 NOTE — ED PROVIDER NOTES
Units into the skin at bedtime    INSULIN LISPRO, 1 UNIT DIAL, (HUMALOG KWIKPEN) 100 UNIT/ML SOPN    Inject 10 Units into the skin 3 times daily (before meals)    INSULIN PEN NEEDLE ([employer] PEN NEEDLES) 31G X 6 MM MISC    1 each by Does not apply route daily    METOPROLOL SUCCINATE (TOPROL XL) 200 MG EXTENDED RELEASE TABLET    TAKE 1 TABLET BY MOUTH ONE TIME A DAY    SACUBITRIL-VALSARTAN (ENTRESTO) 49-51 MG PER TABLET    Take 1 tablet by mouth 2 times daily    TADALAFIL (CIALIS) 10 MG TABLET    Take 1 tablet by mouth daily as needed for Erectile Dysfunction       ALLERGIES     Azithromycin and Atorvastatin    FAMILYHISTORY     No family history on file.     SOCIAL HISTORY       Social History     Tobacco Use    Smoking status: Never    Smokeless tobacco: Never   Substance Use Topics    Alcohol use: Yes     Comment: occ    Drug use: Not Currently       SCREENINGS        Deisy Coma Scale  Eye Opening: Spontaneous  Best Verbal Response: Oriented  Best Motor Response: Obeys commands  Deisy Coma Scale Score: 15                CIWA Assessment  BP: (!) 179/129  Pulse: 100           PHYSICAL EXAM  1 or more Elements     ED Triage Vitals [01/18/25 1044]   BP Systolic BP Percentile Diastolic BP Percentile Temp Temp Source Pulse Respirations SpO2   (!) 178/125 -- -- 98.1 °F (36.7 °C) Oral 90 16 94 %      Height Weight - Scale         1.727 m (5' 8\") 112.6 kg (248 lb 3.8 oz)             Physical Exam  Constitutional:       General: He is not in acute distress.     Appearance: Normal appearance. He is well-developed. He is not ill-appearing, toxic-appearing or diaphoretic.   HENT:      Head: Normocephalic and atraumatic.      Right Ear: Tympanic membrane, ear canal and external ear normal.      Left Ear: Tympanic membrane, ear canal and external ear normal.      Mouth/Throat:      Mouth: Mucous membranes are moist.      Pharynx: Oropharynx is clear. No oropharyngeal exudate or posterior oropharyngeal erythema.    Cardiovascular:      Rate and Rhythm: Normal rate and regular rhythm.      Heart sounds: Normal heart sounds.   Pulmonary:      Effort: Pulmonary effort is normal. No respiratory distress.      Breath sounds: Normal breath sounds. No stridor. No wheezing or rhonchi.   Musculoskeletal:         General: Normal range of motion.      Cervical back: Normal range of motion and neck supple.   Neurological:      Mental Status: He is alert.   Psychiatric:         Mood and Affect: Mood normal.         Behavior: Behavior normal.         Thought Content: Thought content normal.         Judgment: Judgment normal.             DIAGNOSTIC RESULTS   LABS:    Labs Reviewed   COVID-19, RAPID - Abnormal; Notable for the following components:       Result Value    SARS-CoV-2, NAAT DETECTED (*)     All other components within normal limits   RAPID INFLUENZA A/B ANTIGENS       When ordered only abnormal lab results are displayed. All other labs were within normal range or not returned as of this dictation.    EKG: When ordered, EKG's are interpreted by the Emergency Department Physician in the absence of a cardiologist.  Please see their note for interpretation of EKG.      RADIOLOGY:   Non-plain film images such as CT, Ultrasound and MRI are read by the radiologist. Plain radiographic images are visualized and preliminarily interpreted by the ED Provider with the below findings:    Chest x-ray personally viewed and interpreted myself no acute abnormality.  From interpreted radiology.    Interpretation per the Radiologist below, if available at the time of this note:    XR CHEST (2 VW)   Final Result   1. No acute cardiopulmonary process.   2. Pulmonary vascular congestion and mild cardiomegaly.           No results found.      PAST MEDICAL HISTORY      has a past medical history of CHF (congestive heart failure) (HCC), Hyperlipidemia, Hypertension, and Sleep apnea.     EMERGENCY DEPARTMENT COURSE and DIFFERENTIAL DIAGNOSIS/MDM:

## 2025-08-01 ENCOUNTER — HOSPITAL ENCOUNTER (EMERGENCY)
Age: 50
Discharge: HOME | End: 2025-08-01
Attending: STUDENT IN AN ORGANIZED HEALTH CARE EDUCATION/TRAINING PROGRAM
Payer: COMMERCIAL

## 2025-08-01 ENCOUNTER — APPOINTMENT (OUTPATIENT)
Dept: CT IMAGING | Age: 50
End: 2025-08-01
Payer: COMMERCIAL

## 2025-08-01 VITALS
HEART RATE: 77 BPM | DIASTOLIC BLOOD PRESSURE: 114 MMHG | BODY MASS INDEX: 32.68 KG/M2 | RESPIRATION RATE: 15 BRPM | SYSTOLIC BLOOD PRESSURE: 164 MMHG | WEIGHT: 214.95 LBS | TEMPERATURE: 97.7 F | OXYGEN SATURATION: 99 %

## 2025-08-01 DIAGNOSIS — R10.9 BILATERAL FLANK PAIN: Primary | ICD-10-CM

## 2025-08-01 DIAGNOSIS — J30.9 ALLERGIC RHINITIS, UNSPECIFIED SEASONALITY, UNSPECIFIED TRIGGER: ICD-10-CM

## 2025-08-01 LAB
ANION GAP SERPL CALCULATED.3IONS-SCNC: 10 MMOL/L (ref 3–16)
BASOPHILS # BLD: 0 K/UL (ref 0–0.2)
BASOPHILS NFR BLD: 1 %
BILIRUB UR QL STRIP.AUTO: NEGATIVE
BUN SERPL-MCNC: 18 MG/DL (ref 7–20)
CALCIUM SERPL-MCNC: 9.1 MG/DL (ref 8.3–10.6)
CHLORIDE SERPL-SCNC: 102 MMOL/L (ref 99–110)
CLARITY UR: CLEAR
CO2 SERPL-SCNC: 25 MMOL/L (ref 21–32)
COLOR UR: YELLOW
CREAT SERPL-MCNC: 1 MG/DL (ref 0.9–1.3)
DEPRECATED RDW RBC AUTO: 14.4 % (ref 12.4–15.4)
EOSINOPHIL # BLD: 0.1 K/UL (ref 0–0.6)
EOSINOPHIL NFR BLD: 2.7 %
GFR SERPLBLD CREATININE-BSD FMLA CKD-EPI: >90 ML/MIN/{1.73_M2}
GLUCOSE SERPL-MCNC: 307 MG/DL (ref 70–99)
GLUCOSE UR STRIP.AUTO-MCNC: >=1000 MG/DL
HCT VFR BLD AUTO: 41.6 % (ref 40.5–52.5)
HGB BLD-MCNC: 14 G/DL (ref 13.5–17.5)
HGB UR QL STRIP.AUTO: NEGATIVE
KETONES UR STRIP.AUTO-MCNC: NEGATIVE MG/DL
LEUKOCYTE ESTERASE UR QL STRIP.AUTO: NEGATIVE
LYMPHOCYTES # BLD: 1.3 K/UL (ref 1–5.1)
LYMPHOCYTES NFR BLD: 31.2 %
MCH RBC QN AUTO: 28.2 PG (ref 26–34)
MCHC RBC AUTO-ENTMCNC: 33.6 G/DL (ref 31–36)
MCV RBC AUTO: 84 FL (ref 80–100)
MONOCYTES # BLD: 0.3 K/UL (ref 0–1.3)
MONOCYTES NFR BLD: 7.4 %
NEUTROPHILS # BLD: 2.4 K/UL (ref 1.7–7.7)
NEUTROPHILS NFR BLD: 57.7 %
NITRITE UR QL STRIP.AUTO: NEGATIVE
PH UR STRIP.AUTO: 5 [PH] (ref 5–8)
PLATELET # BLD AUTO: 227 K/UL (ref 135–450)
PMV BLD AUTO: 8.5 FL (ref 5–10.5)
POTASSIUM SERPL-SCNC: 4 MMOL/L (ref 3.5–5.1)
PROT UR STRIP.AUTO-MCNC: NEGATIVE MG/DL
RBC # BLD AUTO: 4.96 M/UL (ref 4.2–5.9)
SODIUM SERPL-SCNC: 137 MMOL/L (ref 136–145)
SP GR UR STRIP.AUTO: 1.02 (ref 1–1.03)
UA COMPLETE W REFLEX CULTURE PNL UR: ABNORMAL
UA DIPSTICK W REFLEX MICRO PNL UR: ABNORMAL
URN SPEC COLLECT METH UR: ABNORMAL
UROBILINOGEN UR STRIP-ACNC: 0.2 E.U./DL
WBC # BLD AUTO: 4.1 K/UL (ref 4–11)

## 2025-08-01 PROCEDURE — 81003 URINALYSIS AUTO W/O SCOPE: CPT

## 2025-08-01 PROCEDURE — 80048 BASIC METABOLIC PNL TOTAL CA: CPT

## 2025-08-01 PROCEDURE — 74176 CT ABD & PELVIS W/O CONTRAST: CPT

## 2025-08-01 PROCEDURE — 85025 COMPLETE CBC W/AUTO DIFF WBC: CPT

## 2025-08-01 PROCEDURE — 99284 EMERGENCY DEPT VISIT MOD MDM: CPT

## 2025-08-01 RX ORDER — LORATADINE 10 MG/1
10 TABLET ORAL DAILY
Qty: 10 TABLET | Refills: 0 | Status: SHIPPED | OUTPATIENT
Start: 2025-08-01 | End: 2025-08-11

## 2025-08-01 RX ORDER — FLUTICASONE PROPIONATE 50 MCG
2 SPRAY, SUSPENSION (ML) NASAL DAILY
Qty: 16 G | Refills: 0 | Status: SHIPPED | OUTPATIENT
Start: 2025-08-01

## 2025-08-01 RX ORDER — LIDOCAINE 50 MG/G
1 PATCH TOPICAL DAILY
Qty: 10 PATCH | Refills: 0 | Status: SHIPPED | OUTPATIENT
Start: 2025-08-01 | End: 2025-08-11

## 2025-08-01 ASSESSMENT — PAIN DESCRIPTION - PAIN TYPE: TYPE: ACUTE PAIN

## 2025-08-01 ASSESSMENT — VISUAL ACUITY
OD: 20/50
OS: 20/30
OU: 20/20

## 2025-08-01 ASSESSMENT — PAIN SCALES - GENERAL: PAINLEVEL_OUTOF10: 4

## 2025-08-01 ASSESSMENT — PAIN - FUNCTIONAL ASSESSMENT
PAIN_FUNCTIONAL_ASSESSMENT: 0-10
PAIN_FUNCTIONAL_ASSESSMENT: ACTIVITIES ARE NOT PREVENTED

## 2025-08-01 ASSESSMENT — PAIN DESCRIPTION - LOCATION: LOCATION: BACK

## 2025-08-01 ASSESSMENT — PAIN DESCRIPTION - DESCRIPTORS: DESCRIPTORS: ACHING

## 2025-08-01 NOTE — ED PROVIDER NOTES
EMERGENCY DEPARTMENT ENCOUNTER      CHIEF COMPLAINT    Back Pain (C/o low back pain, states he has kidney disease and is worried it has something to do with that.) and Allergies (C/o congested ears and eye drainage. )      HPI    Rah Curiel is a 49 y.o. male with past medical history significant for CHF, hyperlipidemia, hypertension who presents back pain, dark urine  Patient with bilateral flank pain for the last several months as well as increasing color in his urine specifically the mornings he is worried about his kidney function he also reports having some allergies with congestion and eye drainage otherwise denies fevers chills denies any chest pain or difficulty breathing    PAST MEDICAL HISTORY    Past Medical History:   Diagnosis Date    CHF (congestive heart failure) (HCC)     Hyperlipidemia     Hypertension     Sleep apnea        SURGICAL HISTORY    Past Surgical History:   Procedure Laterality Date    HERNIA REPAIR         CURRENT MEDICATIONS    Current Outpatient Rx   Medication Sig Dispense Refill    benzonatate (TESSALON) 200 MG capsule Take 1 capsule by mouth 3 times daily as needed for Cough 20 capsule 0    cetirizine (ZYRTEC) 10 MG tablet Take 1 tablet by mouth daily 30 tablet 0    metoprolol succinate (TOPROL XL) 200 MG extended release tablet TAKE 1 TABLET BY MOUTH ONE TIME A DAY 30 tablet 0    Empagliflozin-metFORMIN HCl ER (SYNJARDY XR) 12.5-1000 MG TB24 Take 2 tablets by mouth daily 60 tablet 1    sacubitril-valsartan (ENTRESTO) 49-51 MG per tablet Take 1 tablet by mouth 2 times daily 60 tablet 5    tadalafil (CIALIS) 10 MG tablet Take 1 tablet by mouth daily as needed for Erectile Dysfunction 10 tablet 5    glucose monitoring (FREESTYLE FREEDOM) kit 1 kit by Does not apply route daily 1 kit 0    Insulin Glargine-yfgn (SEMGLEE, YFGN,) 100 UNIT/ML SOPN Inject 25 Units into the skin at bedtime 200 mL 0    insulin lispro, 1 Unit Dial, (HUMALOG KWIKPEN) 100 UNIT/ML SOPN Inject 10 Units into

## 2025-08-01 NOTE — ED PROVIDER NOTES
[facility] EMERGENCY DEPARTMENT  EMERGENCY DEPARTMENT ENCOUNTER        Pt Name: Rah Curiel  MRN: [id number]  Birthdate 1975  Date of evaluation: 8/1/2025  Provider: Joy Florence PA-C  PCP: Madeline Bhatti DO  Note Started: 2:24 PM EDT 8/1/25       I have seen and evaluated this patient with my supervising physician Esther Jung MD.      CHIEF COMPLAINT       Chief Complaint   Patient presents with    Back Pain     C/o low back pain, states he has kidney disease and is worried it has something to do with that.    Allergies     C/o congested ears and eye drainage.        HISTORY OF PRESENT ILLNESS: 1 or more Elements     History From: Patient  Limitations to history : None    Rah Curiel is a 49 y.o. male who presents brought in by self escorted by significant other.  Rah is a 49-year-old male with past medical history of hypertension, hyperlipidemia, type II DM, CHF, DAVID who presents for evaluation of bilateral flank pain ongoing for the past month.  Denies any known injuries or inciting events.  The pain is nonmigratory.  Denies any dysuria or hematuria but states that his urine is dark and with a strong odor.  Denies any fevers, chills.  No bowel or bladder retention/incontinence.  No saddle anesthesia or paresthesia.  No weakness or difficulty with ambulation.    Nursing Notes were all reviewed and agreed with or any disagreements were addressed in the HPI.    REVIEW OF SYSTEMS :      Review of Systems    Positives and Pertinent negatives as per HPI.     PAST MEDICAL HISTORY    has a past medical history of CHF (congestive heart failure) (HCC), Hyperlipidemia, Hypertension, and Sleep apnea.     SURGICAL HISTORY     Past Surgical History:   Procedure Laterality Date    HERNIA REPAIR         CURRENTMEDICATIONS       Previous Medications    AMLODIPINE (NORVASC) 5 MG TABLET    Take 1 tablet by mouth nightly    BENZONATATE (TESSALON) 200 MG CAPSULE    Take 1 capsule by mouth 3

## 2025-08-01 NOTE — DISCHARGE INSTR - COC
SIGNATURE:  {Esignature:522297637}    CASE MANAGEMENT/SOCIAL WORK SECTION    Inpatient Status Date: ***    Readmission Risk Assessment Score:  [facility] RISK OF UNPLANNED READMISSION 2.0             0 Total Score        Discharging to Facility/ Agency   Name:   Address:  Phone:  Fax:    Dialysis Facility (if applicable)   Name:  Address:  Dialysis Schedule:  Phone:  Fax:    / signature: {Esignature:862469645}    PHYSICIAN SECTION    Prognosis: {Prognosis:5365529167}    Condition at Discharge: { Patient Condition:733963504}    Rehab Potential (if transferring to Rehab): {Prognosis:4262131553}    Recommended Labs or Other Treatments After Discharge: ***    Physician Certification: I certify the above information and transfer of Rah Curiel  is necessary for the continuing treatment of the diagnosis listed and that he requires {Admit to Appropriate Level of Care:23350} for {GREATER/LESS:428224297} 30 days.     Update Admission H&P: {CHP DME Changes in HandP:142944041}    PHYSICIAN SIGNATURE:  {Esignature:182471270}